# Patient Record
Sex: FEMALE | Race: WHITE | NOT HISPANIC OR LATINO | Employment: FULL TIME | URBAN - NONMETROPOLITAN AREA
[De-identification: names, ages, dates, MRNs, and addresses within clinical notes are randomized per-mention and may not be internally consistent; named-entity substitution may affect disease eponyms.]

---

## 2017-08-11 ENCOUNTER — LAB (OUTPATIENT)
Dept: LAB | Facility: HOSPITAL | Age: 42
End: 2017-08-11

## 2017-08-11 ENCOUNTER — TRANSCRIBE ORDERS (OUTPATIENT)
Dept: GENERAL RADIOLOGY | Facility: HOSPITAL | Age: 42
End: 2017-08-11

## 2017-08-11 ENCOUNTER — HOSPITAL ENCOUNTER (OUTPATIENT)
Dept: GENERAL RADIOLOGY | Facility: HOSPITAL | Age: 42
Discharge: HOME | End: 2017-08-11
Admitting: NURSE PRACTITIONER

## 2017-08-11 DIAGNOSIS — R10.84 GENERALIZED ABDOMINAL PAIN: ICD-10-CM

## 2017-08-11 DIAGNOSIS — R19.7 DIARRHEA, UNSPECIFIED TYPE: ICD-10-CM

## 2017-08-11 DIAGNOSIS — R19.7 DIARRHEA, UNSPECIFIED TYPE: Primary | ICD-10-CM

## 2017-08-11 LAB
ALBUMIN SERPL-MCNC: 4 G/DL (ref 3.5–5)
ALBUMIN/GLOB SERPL: 1 G/DL (ref 1.1–2.5)
ALP SERPL-CCNC: 90 U/L (ref 24–120)
ALT SERPL W P-5'-P-CCNC: 39 U/L (ref 0–54)
ANION GAP SERPL CALCULATED.3IONS-SCNC: 12 MMOL/L (ref 4–13)
AST SERPL-CCNC: 27 U/L (ref 7–45)
AUTO MIXED CELLS #: 0.7 10*3/MM3 (ref 0.1–2.6)
AUTO MIXED CELLS %: 9.3 % (ref 0.1–24)
BILIRUB SERPL-MCNC: 0.5 MG/DL (ref 0.1–1)
BILIRUB UR QL STRIP: NEGATIVE
BUN BLD-MCNC: 10 MG/DL (ref 5–21)
BUN/CREAT SERPL: 14.7
CALCIUM SPEC-SCNC: 9.6 MG/DL (ref 8.4–10.4)
CHLORIDE SERPL-SCNC: 103 MMOL/L (ref 98–110)
CLARITY UR: CLEAR
CO2 SERPL-SCNC: 25 MMOL/L (ref 24–31)
COLOR UR: YELLOW
CREAT BLD-MCNC: 0.68 MG/DL (ref 0.5–1.4)
ERYTHROCYTE [DISTWIDTH] IN BLOOD BY AUTOMATED COUNT: 12.8 % (ref 12–15)
ERYTHROCYTE [SEDIMENTATION RATE] IN BLOOD: 9 MM/HR (ref 0–20)
GFR SERPL CREATININE-BSD FRML MDRD: 94 ML/MIN/1.73
GLOBULIN UR ELPH-MCNC: 4 GM/DL
GLUCOSE BLD-MCNC: 89 MG/DL (ref 70–100)
GLUCOSE UR STRIP-MCNC: NEGATIVE MG/DL
HCT VFR BLD AUTO: 39.5 % (ref 37–47)
HGB BLD-MCNC: 13.5 G/DL (ref 12–16)
HGB UR QL STRIP.AUTO: NEGATIVE
KETONES UR QL STRIP: NEGATIVE
LEUKOCYTE ESTERASE UR QL STRIP.AUTO: NEGATIVE
LYMPHOCYTES # BLD AUTO: 2.6 10*3/MM3 (ref 0.8–7)
LYMPHOCYTES NFR BLD AUTO: 32.9 % (ref 15–45)
MCH RBC QN AUTO: 30.1 PG (ref 28–32)
MCHC RBC AUTO-ENTMCNC: 34.2 G/DL (ref 33–36)
MCV RBC AUTO: 88.2 FL (ref 82–98)
NEUTROPHILS # BLD AUTO: 4.5 10*3/MM3 (ref 1.5–8.3)
NEUTROPHILS NFR BLD AUTO: 57.8 % (ref 39–78)
NITRITE UR QL STRIP: NEGATIVE
PH UR STRIP.AUTO: 5.5 [PH] (ref 5–8)
PLATELET # BLD AUTO: 290 10*3/MM3 (ref 130–400)
PMV BLD AUTO: 8.9 FL (ref 6–12)
POTASSIUM BLD-SCNC: 4.6 MMOL/L (ref 3.5–5.3)
PROT SERPL-MCNC: 8 G/DL (ref 6.3–8.7)
PROT UR QL STRIP: NEGATIVE
RBC # BLD AUTO: 4.48 10*6/MM3 (ref 4.2–5.4)
SODIUM BLD-SCNC: 140 MMOL/L (ref 135–145)
SP GR UR STRIP: 1.02 (ref 1–1.03)
UROBILINOGEN UR QL STRIP: NORMAL
WBC NRBC COR # BLD: 7.8 10*3/MM3 (ref 4.8–10.8)

## 2017-08-11 PROCEDURE — 36415 COLL VENOUS BLD VENIPUNCTURE: CPT

## 2017-08-11 PROCEDURE — 85025 COMPLETE CBC W/AUTO DIFF WBC: CPT

## 2017-08-11 PROCEDURE — 85652 RBC SED RATE AUTOMATED: CPT

## 2017-08-11 PROCEDURE — 80053 COMPREHEN METABOLIC PANEL: CPT

## 2017-08-11 PROCEDURE — 74000 HC ABDOMEN KUB: CPT

## 2017-08-11 PROCEDURE — 81003 URINALYSIS AUTO W/O SCOPE: CPT

## 2017-08-21 ENCOUNTER — TRANSCRIBE ORDERS (OUTPATIENT)
Dept: ADMINISTRATIVE | Facility: HOSPITAL | Age: 42
End: 2017-08-21

## 2017-08-21 DIAGNOSIS — Z13.9 SCREENING: Primary | ICD-10-CM

## 2017-08-25 ENCOUNTER — HOSPITAL ENCOUNTER (OUTPATIENT)
Dept: MAMMOGRAPHY | Facility: HOSPITAL | Age: 42
Discharge: HOME | End: 2017-08-25
Admitting: OBSTETRICS & GYNECOLOGY

## 2017-08-25 DIAGNOSIS — Z13.9 SCREENING: ICD-10-CM

## 2017-08-25 PROCEDURE — G0202 SCR MAMMO BI INCL CAD: HCPCS

## 2017-08-25 PROCEDURE — 77063 BREAST TOMOSYNTHESIS BI: CPT

## 2019-09-26 ENCOUNTER — OFFICE VISIT (OUTPATIENT)
Dept: GASTROENTEROLOGY | Facility: CLINIC | Age: 44
End: 2019-09-26

## 2019-09-26 VITALS
TEMPERATURE: 97.8 F | HEART RATE: 86 BPM | SYSTOLIC BLOOD PRESSURE: 126 MMHG | OXYGEN SATURATION: 98 % | DIASTOLIC BLOOD PRESSURE: 88 MMHG | BODY MASS INDEX: 38.49 KG/M2 | HEIGHT: 68 IN | WEIGHT: 254 LBS

## 2019-09-26 DIAGNOSIS — R13.19 ESOPHAGEAL DYSPHAGIA: ICD-10-CM

## 2019-09-26 DIAGNOSIS — K21.9 GASTROESOPHAGEAL REFLUX DISEASE, ESOPHAGITIS PRESENCE NOT SPECIFIED: Primary | ICD-10-CM

## 2019-09-26 DIAGNOSIS — E66.9 OBESITY, UNSPECIFIED OBESITY SEVERITY, UNSPECIFIED OBESITY TYPE: ICD-10-CM

## 2019-09-26 DIAGNOSIS — Z78.9 NONSMOKER: ICD-10-CM

## 2019-09-26 PROCEDURE — 99214 OFFICE O/P EST MOD 30 MIN: CPT | Performed by: CLINICAL NURSE SPECIALIST

## 2019-09-26 NOTE — PROGRESS NOTES
Yola Mitchell  1975 9/26/2019  Chief Complaint   Patient presents with   • Heartburn   • Difficulty Swallowing     Subjective   HPI  Yola Mitchell is a 43 y.o. female who presents with a complaint of reflux and difficulty swallowing. She says she was provided PPI and this is better with the medication. She is having occasional breakthrough at least once per week. It is mild to moderate. Her dysphagia is with meat or breads dry foods it is intermittent and better with PPI therapy. Omeprazole 40 mg daily. Her reflux is described as a burning up to her throat. She has had this for many years since the 90s. She has taken Protonix for this in the past. She is on NSAIDS. No nausea or vomiting currently. NO wt loss. No fever chills or sweats.   Past Medical History:   Diagnosis Date   • Back injury    • Depression    • GERD (gastroesophageal reflux disease)    • Hyperlipidemia    • Hypertension    • IBS (irritable bowel syndrome)      Past Surgical History:   Procedure Laterality Date   • COLONOSCOPY     • CYST REMOVAL     • LEEP     • WISDOM TOOTH EXTRACTION         Outpatient Medications Marked as Taking for the 9/26/19 encounter (Office Visit) with Cici Wall APRN   Medication Sig Dispense Refill   • baclofen (LIORESAL) 10 MG tablet Take 10 mg by mouth 3 (Three) Times a Day.     • budesonide (RINOCORT AQUA) 32 MCG/ACT nasal spray 1 spray into the nostril(s) as directed by provider Daily.     • FLUoxetine (PROzac) 60 MG tablet Take 10 mg by mouth Daily.     • gabapentin (NEURONTIN) 300 MG capsule Take 300 mg by mouth 2 (Two) Times a Day.     • loratadine-pseudoephedrine (CLARITIN-D 24 HOUR)  MG per 24 hr tablet Take 1 tablet by mouth Daily. 7 tablet 0   • meloxicam (MOBIC) 15 MG tablet Take 15 mg by mouth Daily.     • omeprazole (priLOSEC) 40 MG capsule Take 40 mg by mouth Daily.     • simvastatin (ZOCOR) 40 MG tablet Take 40 mg by mouth Every Night.       No Known Allergies  Social History      Socioeconomic History   • Marital status:      Spouse name: Not on file   • Number of children: Not on file   • Years of education: Not on file   • Highest education level: Not on file   Tobacco Use   • Smoking status: Never Smoker   • Smokeless tobacco: Never Used   Substance and Sexual Activity   • Alcohol use: Yes     Comment: occ.    • Drug use: No   • Sexual activity: Defer     Family History   Problem Relation Age of Onset   • Breast cancer Neg Hx    • Colon cancer Neg Hx    • Colon polyps Neg Hx      Health Maintenance   Topic Date Due   • ANNUAL PHYSICAL  11/30/1976   • TDAP/TD VACCINES (1 - Tdap) 11/30/1992   • LIPID PANEL  01/22/2018   • PAP SMEAR  09/20/2019   • INFLUENZA VACCINE  Completed     Review of Systems   Constitutional: Negative for activity change, appetite change, chills, diaphoresis, fatigue, fever and unexpected weight change.   HENT: Positive for trouble swallowing. Negative for ear pain, hearing loss, mouth sores, sore throat and voice change.    Eyes: Negative.    Respiratory: Negative for cough, choking, shortness of breath and wheezing.    Cardiovascular: Negative for chest pain and palpitations.   Gastrointestinal: Negative for abdominal pain, blood in stool, constipation, diarrhea, nausea and vomiting.        GERD   Endocrine: Negative for cold intolerance and heat intolerance.   Genitourinary: Negative for decreased urine volume, dysuria, frequency, hematuria and urgency.   Musculoskeletal: Negative for back pain, gait problem and myalgias.   Skin: Negative for color change, pallor and rash.   Allergic/Immunologic: Negative for food allergies and immunocompromised state.   Neurological: Negative for dizziness, tremors, seizures, syncope, weakness, light-headedness, numbness and headaches.   Hematological: Negative for adenopathy. Does not bruise/bleed easily.   Psychiatric/Behavioral: Negative for agitation and confusion. The patient is not nervous/anxious.    All other  "systems reviewed and are negative.    Objective   Vitals:    09/26/19 0940   BP: 126/88   Pulse: 86   Temp: 97.8 °F (36.6 °C)   SpO2: 98%   Weight: 115 kg (254 lb)   Height: 172.7 cm (68\")     Body mass index is 38.62 kg/m².  Physical Exam   Constitutional: She is oriented to person, place, and time. She appears well-developed and well-nourished.   HENT:   Head: Normocephalic and atraumatic.   Eyes: Pupils are equal, round, and reactive to light.   Neck: Normal range of motion. Neck supple. No tracheal deviation present.   Cardiovascular: Normal rate, regular rhythm and normal heart sounds. Exam reveals no gallop and no friction rub.   No murmur heard.  Pulmonary/Chest: Effort normal and breath sounds normal. No respiratory distress. She has no wheezes. She has no rales. She exhibits no tenderness.   Abdominal: Soft. Bowel sounds are normal. She exhibits no distension. There is no hepatosplenomegaly. There is no tenderness. There is no rigidity, no rebound and no guarding.   Musculoskeletal: Normal range of motion. She exhibits no edema, tenderness or deformity.   Neurological: She is alert and oriented to person, place, and time. She has normal reflexes.   Skin: Skin is warm and dry. No rash noted. No pallor.   Psychiatric: She has a normal mood and affect. Her behavior is normal. Judgment and thought content normal.     Assessment/Plan   Yola was seen today for heartburn and difficulty swallowing.    Diagnoses and all orders for this visit:    Gastroesophageal reflux disease, esophagitis presence not specified  -     Case Request; Standing  -     Follow Anesthesia Guidelines / Standing Orders; Future  -     Obtain Informed Consent; Future  -     Implement Anesthesia Orders Day of Procedure; Standing  -     Obtain Informed Consent; Standing  -     Case Request    Esophageal dysphagia  Comments:  better with medication    Nonsmoker    Obesity, unspecified obesity severity, unspecified obesity type    I discussed " non pharmaceutical treatment of gerd.  This includes gradually losing weight to achieve ideal body wt., elevation of the head of bed by 4-6 inches, nothing to eat or drink 3 hours prior to lying down, avoiding tight clothing, stress reduction, tobacco cessation, reduction of alcohol intake, and dietary restrictions (avoiding caffeine, coffee, fatty foods, mints, chocolate, spicy foods and tomato based sauces as much as possible).    Add Gaviscon as needed for breakthrough    ESOPHAGOGASTRODUODENOSCOPY WITH ANESTHESIA (N/A)  EMR Dragon/transcription disclaimer: Much of this encounter note is electronic transcription/translation of spoken language to printed text. The electronic translation of spoken language may be erroneous, or at times, nonsensical words or phrases may be inadvertently transcribed. Although I have reviewed the note for such errors, some may still exist.  Body mass index is 38.62 kg/m².  Return if symptoms worsen or fail to improve.    Patient's Body mass index is 38.62 kg/m². BMI is above normal parameters. Recommendations include: nutrition counseling.      All risks, benefits, alternatives, and indications of colonoscopy and/or Endoscopy procedure have been discussed with the patient. Risks to include perforation of the colon requiring possible surgery or colostomy, risk of bleeding from biopsies or removal of colon tissue, possibility of missing a colon polyp or cancer, or adverse drug reaction.  Benefits to include the diagnosis and management of disease of the colon and rectum. Alternatives to include barium enema, radiographic evaluation, lab testing or no intervention. Pt verbalizes understanding and agrees.     Cici Wall, APRN  9/26/2019  10:00 AM      Obesity, Adult  Obesity is the condition of having too much total body fat. Being overweight or obese means that your weight is greater than what is considered healthy for your body size. Obesity is determined by a measurement  called BMI. BMI is an estimate of body fat and is calculated from height and weight. For adults, a BMI of 30 or higher is considered obese.  Obesity can eventually lead to other health concerns and major illnesses, including:  · Stroke.  · Coronary artery disease (CAD).  · Type 2 diabetes.  · Some types of cancer, including cancers of the colon, breast, uterus, and gallbladder.  · Osteoarthritis.  · High blood pressure (hypertension).  · High cholesterol.  · Sleep apnea.  · Gallbladder stones.  · Infertility problems.  What are the causes?  The main cause of obesity is taking in (consuming) more calories than your body uses for energy. Other factors that contribute to this condition may include:  · Being born with genes that make you more likely to become obese.  · Having a medical condition that causes obesity. These conditions include:  ¨ Hypothyroidism.  ¨ Polycystic ovarian syndrome (PCOS).  ¨ Binge-eating disorder.  ¨ Cushing syndrome.  · Taking certain medicines, such as steroids, antidepressants, and seizure medicines.  · Not being physically active (sedentary lifestyle).  · Living where there are limited places to exercise safely or buy healthy foods.  · Not getting enough sleep.  What increases the risk?  The following factors may increase your risk of this condition:  · Having a family history of obesity.  · Being a woman of -American descent.  · Being a man of  descent.  What are the signs or symptoms?  Having excessive body fat is the main symptom of this condition.  How is this diagnosed?  This condition may be diagnosed based on:  · Your symptoms.  · Your medical history.  · A physical exam. Your health care provider may measure:  ¨ Your BMI. If you are an adult with a BMI between 25 and less than 30, you are considered overweight. If you are an adult with a BMI of 30 or higher, you are considered obese.  ¨ The distances around your hips and your waist (circumferences). These may be  compared to each other to help diagnose your condition.  ¨ Your skinfold thickness. Your health care provider may gently pinch a fold of your skin and measure it.  How is this treated?  Treatment for this condition often includes changing your lifestyle. Treatment may include some or all of the following:  · Dietary changes. Work with your health care provider and a dietitian to set a weight-loss goal that is healthy and reasonable for you. Dietary changes may include eating:  ¨ Smaller portions. A portion size is the amount of a particular food that is healthy for you to eat at one time. This varies from person to person.  ¨ Low-calorie or low-fat options.  ¨ More whole grains, fruits, and vegetables.  · Regular physical activity. This may include aerobic activity (cardio) and strength training.  · Medicine to help you lose weight. Your health care provider may prescribe medicine if you are unable to lose 1 pound a week after 6 weeks of eating more healthily and doing more physical activity.  · Surgery. Surgical options may include gastric banding and gastric bypass. Surgery may be done if:  ¨ Other treatments have not helped to improve your condition.  ¨ You have a BMI of 40 or higher.  ¨ You have life-threatening health problems related to obesity.  Follow these instructions at home:     Eating and drinking     · Follow recommendations from your health care provider about what you eat and drink. Your health care provider may advise you to:  ¨ Limit fast foods, sweets, and processed snack foods.  ¨ Choose low-fat options, such as low-fat milk instead of whole milk.  ¨ Eat 5 or more servings of fruits or vegetables every day.  ¨ Eat at home more often. This gives you more control over what you eat.  ¨ Choose healthy foods when you eat out.  ¨ Learn what a healthy portion size is.  ¨ Keep low-fat snacks on hand.  ¨ Avoid sugary drinks, such as soda, fruit juice, iced tea sweetened with sugar, and flavored  milk.  ¨ Eat a healthy breakfast.  · Drink enough water to keep your urine clear or pale yellow.  · Do not go without eating for long periods of time (do not fast) or follow a fad diet. Fasting and fad diets can be unhealthy and even dangerous.  Physical Activity   · Exercise regularly, as told by your health care provider. Ask your health care provider what types of exercise are safe for you and how often you should exercise.  · Warm up and stretch before being active.  · Cool down and stretch after being active.  · Rest between periods of activity.  Lifestyle   · Limit the time that you spend in front of your TV, computer, or video game system.  · Find ways to reward yourself that do not involve food.  · Limit alcohol intake to no more than 1 drink a day for nonpregnant women and 2 drinks a day for men. One drink equals 12 oz of beer, 5 oz of wine, or 1½ oz of hard liquor.  General instructions   · Keep a weight loss journal to keep track of the food you eat and how much you exercise you get.  · Take over-the-counter and prescription medicines only as told by your health care provider.  · Take vitamins and supplements only as told by your health care provider.  · Consider joining a support group. Your health care provider may be able to recommend a support group.  · Keep all follow-up visits as told by your health care provider. This is important.  Contact a health care provider if:  · You are unable to meet your weight loss goal after 6 weeks of dietary and lifestyle changes.  This information is not intended to replace advice given to you by your health care provider. Make sure you discuss any questions you have with your health care provider.  Document Released: 01/25/2006 Document Revised: 05/22/2017 Document Reviewed: 10/05/2016  [employer] Interactive Patient Education © 2017 [employer]      If you smoke or use tobacco, 4 minutes reading provided  Steps to Quit Smoking  Smoking tobacco can be harmful to  your health and can affect almost every organ in your body. Smoking puts you, and those around you, at risk for developing many serious chronic diseases. Quitting smoking is difficult, but it is one of the best things that you can do for your health. It is never too late to quit.  What are the benefits of quitting smoking?  When you quit smoking, you lower your risk of developing serious diseases and conditions, such as:  · Lung cancer or lung disease, such as COPD.  · Heart disease.  · Stroke.  · Heart attack.  · Infertility.  · Osteoporosis and bone fractures.  Additionally, symptoms such as coughing, wheezing, and shortness of breath may get better when you quit. You may also find that you get sick less often because your body is stronger at fighting off colds and infections. If you are pregnant, quitting smoking can help to reduce your chances of having a baby of low birth weight.  How do I get ready to quit?  When you decide to quit smoking, create a plan to make sure that you are successful. Before you quit:  · Pick a date to quit. Set a date within the next two weeks to give you time to prepare.  · Write down the reasons why you are quitting. Keep this list in places where you will see it often, such as on your bathroom mirror or in your car or wallet.  · Identify the people, places, things, and activities that make you want to smoke (triggers) and avoid them. Make sure to take these actions:  ¨ Throw away all cigarettes at home, at work, and in your car.  ¨ Throw away smoking accessories, such as ashtrays and lighters.  ¨ Clean your car and make sure to empty the ashtray.  ¨ Clean your home, including curtains and carpets.  · Tell your family, friends, and coworkers that you are quitting. Support from your loved ones can make quitting easier.  · Talk with your health care provider about your options for quitting smoking.  · Find out what treatment options are covered by your health insurance.  What  strategies can I use to quit smoking?  Talk with your healthcare provider about different strategies to quit smoking. Some strategies include:  · Quitting smoking altogether instead of gradually lessening how much you smoke over a period of time. Research shows that quitting “cold turkey” is more successful than gradually quitting.  · Attending in-person counseling to help you build problem-solving skills. You are more likely to have success in quitting if you attend several counseling sessions. Even short sessions of 10 minutes can be effective.  · Finding resources and support systems that can help you to quit smoking and remain smoke-free after you quit. These resources are most helpful when you use them often. They can include:  ¨ Online chats with a counselor.  ¨ Telephone quitlines.  ¨ Printed self-help materials.  ¨ Support groups or group counseling.  ¨ Text messaging programs.  ¨ Mobile phone applications.  · Taking medicines to help you quit smoking. (If you are pregnant or breastfeeding, talk with your health care provider first.) Some medicines contain nicotine and some do not. Both types of medicines help with cravings, but the medicines that include nicotine help to relieve withdrawal symptoms. Your health care provider may recommend:  ¨ Nicotine patches, gum, or lozenges.  ¨ Nicotine inhalers or sprays.  ¨ Non-nicotine medicine that is taken by mouth.  Talk with your health care provider about combining strategies, such as taking medicines while you are also receiving in-person counseling. Using these two strategies together makes you more likely to succeed in quitting than if you used either strategy on its own.  If you are pregnant or breastfeeding, talk with your health care provider about finding counseling or other support strategies to quit smoking. Do not take medicine to help you quit smoking unless told to do so by your health care provider.  What things can I do to make it easier to  quit?  Quitting smoking might feel overwhelming at first, but there is a lot that you can do to make it easier. Take these important actions:  · Reach out to your family and friends and ask that they support and encourage you during this time. Call telephone quitlines, reach out to support groups, or work with a counselor for support.  · Ask people who smoke to avoid smoking around you.  · Avoid places that trigger you to smoke, such as bars, parties, or smoke-break areas at work.  · Spend time around people who do not smoke.  · Lessen stress in your life, because stress can be a smoking trigger for some people. To lessen stress, try:  ¨ Exercising regularly.  ¨ Deep-breathing exercises.  ¨ Yoga.  ¨ Meditating.  ¨ Performing a body scan. This involves closing your eyes, scanning your body from head to toe, and noticing which parts of your body are particularly tense. Purposefully relax the muscles in those areas.  · Download or purchase mobile phone or tablet apps (applications) that can help you stick to your quit plan by providing reminders, tips, and encouragement. There are many free apps, such as QuitGuide from the CDC (Centers for Disease Control and Prevention). You can find other support for quitting smoking (smoking cessation) through smokefree.gov and other websites.  How will I feel when I quit smoking?  Within the first 24 hours of quitting smoking, you may start to feel some withdrawal symptoms. These symptoms are usually most noticeable 2-3 days after quitting, but they usually do not last beyond 2-3 weeks. Changes or symptoms that you might experience include:  · Mood swings.  · Restlessness, anxiety, or irritation.  · Difficulty concentrating.  · Dizziness.  · Strong cravings for sugary foods in addition to nicotine.  · Mild weight gain.  · Constipation.  · Nausea.  · Coughing or a sore throat.  · Changes in how your medicines work in your body.  · A depressed mood.  · Difficulty sleeping  (insomnia).  After the first 2-3 weeks of quitting, you may start to notice more positive results, such as:  · Improved sense of smell and taste.  · Decreased coughing and sore throat.  · Slower heart rate.  · Lower blood pressure.  · Clearer skin.  · The ability to breathe more easily.  · Fewer sick days.  Quitting smoking is very challenging for most people. Do not get discouraged if you are not successful the first time. Some people need to make many attempts to quit before they achieve long-term success. Do your best to stick to your quit plan, and talk with your health care provider if you have any questions or concerns.  This information is not intended to replace advice given to you by your health care provider. Make sure you discuss any questions you have with your health care provider.  Document Released: 12/12/2002 Document Revised: 08/15/2017 Document Reviewed: 05/03/2016  [employer] Interactive Patient Education © 2017 [employer]

## 2019-10-29 ENCOUNTER — HOSPITAL ENCOUNTER (OUTPATIENT)
Facility: HOSPITAL | Age: 44
Setting detail: HOSPITAL OUTPATIENT SURGERY
Discharge: HOME | End: 2019-10-29
Attending: INTERNAL MEDICINE | Admitting: INTERNAL MEDICINE

## 2019-10-29 ENCOUNTER — ANESTHESIA EVENT (OUTPATIENT)
Dept: GASTROENTEROLOGY | Facility: HOSPITAL | Age: 44
End: 2019-10-29

## 2019-10-29 ENCOUNTER — ANESTHESIA (OUTPATIENT)
Dept: GASTROENTEROLOGY | Facility: HOSPITAL | Age: 44
End: 2019-10-29

## 2019-10-29 VITALS
WEIGHT: 246 LBS | SYSTOLIC BLOOD PRESSURE: 111 MMHG | BODY MASS INDEX: 37.28 KG/M2 | DIASTOLIC BLOOD PRESSURE: 62 MMHG | HEIGHT: 68 IN | TEMPERATURE: 97.2 F | RESPIRATION RATE: 18 BRPM | OXYGEN SATURATION: 100 % | HEART RATE: 69 BPM

## 2019-10-29 DIAGNOSIS — K21.9 GASTROESOPHAGEAL REFLUX DISEASE, ESOPHAGITIS PRESENCE NOT SPECIFIED: ICD-10-CM

## 2019-10-29 LAB — B-HCG UR QL: NEGATIVE

## 2019-10-29 PROCEDURE — 81025 URINE PREGNANCY TEST: CPT | Performed by: NURSE ANESTHETIST, CERTIFIED REGISTERED

## 2019-10-29 PROCEDURE — 43239 EGD BIOPSY SINGLE/MULTIPLE: CPT | Performed by: INTERNAL MEDICINE

## 2019-10-29 PROCEDURE — 87081 CULTURE SCREEN ONLY: CPT | Performed by: INTERNAL MEDICINE

## 2019-10-29 PROCEDURE — 25010000002 PROPOFOL 10 MG/ML EMULSION: Performed by: NURSE ANESTHETIST, CERTIFIED REGISTERED

## 2019-10-29 RX ORDER — SODIUM CHLORIDE 9 MG/ML
500 INJECTION, SOLUTION INTRAVENOUS CONTINUOUS PRN
Status: DISCONTINUED | OUTPATIENT
Start: 2019-10-29 | End: 2019-10-29 | Stop reason: HOSPADM

## 2019-10-29 RX ORDER — PROPOFOL 10 MG/ML
VIAL (ML) INTRAVENOUS AS NEEDED
Status: DISCONTINUED | OUTPATIENT
Start: 2019-10-29 | End: 2019-10-29 | Stop reason: SURG

## 2019-10-29 RX ORDER — SODIUM CHLORIDE 0.9 % (FLUSH) 0.9 %
10 SYRINGE (ML) INJECTION AS NEEDED
Status: DISCONTINUED | OUTPATIENT
Start: 2019-10-29 | End: 2019-10-29 | Stop reason: HOSPADM

## 2019-10-29 RX ADMIN — PROPOFOL 100 MG: 10 INJECTION, EMULSION INTRAVENOUS at 07:56

## 2019-10-29 RX ADMIN — LIDOCAINE HYDROCHLORIDE 40 MG: 20 INJECTION, SOLUTION INTRAVENOUS at 07:56

## 2019-10-29 RX ADMIN — PROPOFOL 25 MG: 10 INJECTION, EMULSION INTRAVENOUS at 07:58

## 2019-10-29 RX ADMIN — SODIUM CHLORIDE 500 ML: 9 INJECTION, SOLUTION INTRAVENOUS at 07:24

## 2019-10-29 RX ADMIN — PROPOFOL 25 MG: 10 INJECTION, EMULSION INTRAVENOUS at 07:57

## 2019-10-29 NOTE — ANESTHESIA PREPROCEDURE EVALUATION
Anesthesia Evaluation     NPO Solid Status: > 8 hours  NPO Liquid Status: > 8 hours           Airway   Mallampati: II  TM distance: >3 FB  No difficulty expected  Dental - normal exam     Pulmonary - negative pulmonary ROS and normal exam   Cardiovascular - normal exam    (+) hypertension well controlled, hyperlipidemia,       Neuro/Psych- negative ROS  GI/Hepatic/Renal/Endo    (+) obesity, morbid obesity, GERD poorly controlled,      Musculoskeletal (-) negative ROS    Abdominal  - normal exam   Substance History - negative use     OB/GYN negative ob/gyn ROS         Other - negative ROS                       Anesthesia Plan    ASA 2     MAC     intravenous induction   Anesthetic plan, all risks, benefits, and alternatives have been provided, discussed and informed consent has been obtained with: patient.

## 2019-10-29 NOTE — ANESTHESIA POSTPROCEDURE EVALUATION
Patient: Yola Mitchell    Procedure Summary     Date:  10/29/19 Room / Location:  [facility] ENDOSCOPY 6 / [facility]    Anesthesia Start:  0755 Anesthesia Stop:  0802    Procedure:  ESOPHAGOGASTRODUODENOSCOPY WITH ANESTHESIA (N/A ) Diagnosis:       Gastroesophageal reflux disease, esophagitis presence not specified      (Gastroesophageal reflux disease, esophagitis presence not specified [K21.9])    Surgeon:  Berry Turner MD Provider:  Grant Helms CRNA    Anesthesia Type:  MAC ASA Status:  2          Anesthesia Type: MAC  Last vitals  BP   111/62 (10/29/19 0820)   Temp   97.2 °F (36.2 °C) (10/29/19 0704)   Pulse   69 (10/29/19 0820)   Resp   18 (10/29/19 0820)     SpO2   100 % (10/29/19 0820)     Post Anesthesia Care and Evaluation    Patient location during evaluation: PHASE II  Patient participation: complete - patient participated  Level of consciousness: awake  Pain score: 0  Pain management: adequate  Airway patency: patent  Anesthetic complications: No anesthetic complications  PONV Status: none  Cardiovascular status: acceptable  Respiratory status: acceptable  Hydration status: acceptable  No anesthesia care post op

## 2019-10-30 LAB — UREASE TISS QL: NEGATIVE

## 2020-03-25 ENCOUNTER — TRANSCRIBE ORDERS (OUTPATIENT)
Dept: ADMINISTRATIVE | Facility: HOSPITAL | Age: 45
End: 2020-03-25

## 2020-03-25 DIAGNOSIS — Z12.31 ENCOUNTER FOR SCREENING MAMMOGRAM FOR MALIGNANT NEOPLASM OF BREAST: Primary | ICD-10-CM

## 2020-12-08 ENCOUNTER — TRANSCRIBE ORDERS (OUTPATIENT)
Dept: LAB | Facility: HOSPITAL | Age: 45
End: 2020-12-08

## 2020-12-08 ENCOUNTER — LAB (OUTPATIENT)
Dept: LAB | Facility: HOSPITAL | Age: 45
End: 2020-12-08

## 2020-12-08 DIAGNOSIS — Z20.828 VIRAL DISEASE EXPOSURE: ICD-10-CM

## 2020-12-08 DIAGNOSIS — R50.9 FEVER, UNSPECIFIED: ICD-10-CM

## 2020-12-08 DIAGNOSIS — R06.02 SHORTNESS OF BREATH: ICD-10-CM

## 2020-12-08 DIAGNOSIS — R05.9 COUGH: ICD-10-CM

## 2020-12-08 DIAGNOSIS — R05.9 COUGH: Primary | ICD-10-CM

## 2020-12-08 LAB — SARS-COV-2 RNA RESP QL NAA+PROBE: NOT DETECTED

## 2020-12-08 PROCEDURE — 87635 SARS-COV-2 COVID-19 AMP PRB: CPT

## 2020-12-08 PROCEDURE — C9803 HOPD COVID-19 SPEC COLLECT: HCPCS

## 2021-01-07 ENCOUNTER — TRANSCRIBE ORDERS (OUTPATIENT)
Dept: LAB | Facility: HOSPITAL | Age: 46
End: 2021-01-07

## 2021-01-07 ENCOUNTER — LAB (OUTPATIENT)
Dept: LAB | Facility: HOSPITAL | Age: 46
End: 2021-01-07

## 2021-01-07 DIAGNOSIS — R11.0 NAUSEA: ICD-10-CM

## 2021-01-07 DIAGNOSIS — J06.9 ACUTE UPPER RESPIRATORY INFECTION: Primary | ICD-10-CM

## 2021-01-07 DIAGNOSIS — R51.9 INTRACTABLE HEADACHE, UNSPECIFIED CHRONICITY PATTERN, UNSPECIFIED HEADACHE TYPE: ICD-10-CM

## 2021-01-07 DIAGNOSIS — J06.9 ACUTE UPPER RESPIRATORY INFECTION: ICD-10-CM

## 2021-01-07 PROCEDURE — C9803 HOPD COVID-19 SPEC COLLECT: HCPCS

## 2021-01-07 PROCEDURE — U0004 COV-19 TEST NON-CDC HGH THRU: HCPCS

## 2021-01-08 LAB — SARS-COV-2 ORF1AB RESP QL NAA+PROBE: NOT DETECTED

## 2021-01-22 ENCOUNTER — HOSPITAL ENCOUNTER (OUTPATIENT)
Dept: MAMMOGRAPHY | Facility: HOSPITAL | Age: 46
Discharge: HOME | End: 2021-01-22
Admitting: FAMILY MEDICINE

## 2021-01-22 DIAGNOSIS — Z12.31 ENCOUNTER FOR SCREENING MAMMOGRAM FOR MALIGNANT NEOPLASM OF BREAST: ICD-10-CM

## 2021-01-22 PROCEDURE — 77067 SCR MAMMO BI INCL CAD: CPT

## 2021-01-22 PROCEDURE — 77063 BREAST TOMOSYNTHESIS BI: CPT

## 2021-04-15 ENCOUNTER — TRANSCRIBE ORDERS (OUTPATIENT)
Dept: LAB | Facility: HOSPITAL | Age: 46
End: 2021-04-15

## 2021-04-15 DIAGNOSIS — Z01.818 PREOPERATIVE TESTING: Primary | ICD-10-CM

## 2021-04-19 ENCOUNTER — LAB (OUTPATIENT)
Dept: LAB | Facility: HOSPITAL | Age: 46
End: 2021-04-19

## 2021-04-19 DIAGNOSIS — Z01.818 PREOPERATIVE TESTING: ICD-10-CM

## 2021-04-19 LAB — SARS-COV-2 ORF1AB RESP QL NAA+PROBE: NOT DETECTED

## 2021-04-19 PROCEDURE — U0004 COV-19 TEST NON-CDC HGH THRU: HCPCS

## 2021-04-19 PROCEDURE — C9803 HOPD COVID-19 SPEC COLLECT: HCPCS

## 2021-04-21 NOTE — PROGRESS NOTES
YOB: 1975  Location: [facility]  Location Address: [address] [place] [address], [facility], [address] [place] [address]  Location Phone: [phone]    Chief Complaint   Patient presents with   • Sinus Problem     sinus headaches, deviated septum, sinus congestion, drainage, some coughing been going on for years, pt feels like it is getting worse and lasting longer each time       History of Present Illness  Yola Mitchell is a 45 y.o. female.  Yola Mitchell is here for evaluation of ENT complaints. The patient has had problems with ear fullness and ear pressure, nasal drainage, nasal congestion, sinusitis, sinus pressure, postnasal drip, facial pressure and headaches  The symptoms are localized to the ears, nose and right>left orbital, maxillary and frontal sinus. . The patient has had moderate symptoms. The symptoms have been present for the last several years There have been no identified factors that aggravate the symptoms. There have been no factors that have improved the symptoms. Patient also has sore throat, dental pain and reflux, which is medicated. Patient has not had testing. She has been on nasal sprays and antibiotics with minimal relief.     She is using nasal sprays in the past with no relief as well as antibiotics with minimal relief for several days when she completed her course.  She has had immunotherapy for years and is still on immunotherapy per Dr. Deal.       Past Medical History:   Diagnosis Date   • Back injury    • Depression    • GERD (gastroesophageal reflux disease)    • Hyperlipidemia    • Hypertension    • IBS (irritable bowel syndrome)    • Sinusitis        Past Surgical History:   Procedure Laterality Date   • COLONOSCOPY     • CYST REMOVAL     • ENDOSCOPY N/A 10/29/2019    Procedure: ESOPHAGOGASTRODUODENOSCOPY WITH ANESTHESIA;  Surgeon: Berry Turner MD;  Location: [facility] ENDOSCOPY;  Service: Gastroenterology   • LEEP     • WISDOM TOOTH EXTRACTION         Outpatient  Medications Marked as Taking for the 4/22/21 encounter (Office Visit) with Foster Watters MD   Medication Sig Dispense Refill   • ALPRAZolam (XANAX) 0.5 MG tablet Take 0.5 mg by mouth Daily As Needed.     • baclofen (LIORESAL) 10 MG tablet Take 10 mg by mouth 3 (Three) Times a Day.     • budesonide (RINOCORT AQUA) 32 MCG/ACT nasal spray 1 spray into the nostril(s) as directed by provider Daily.     • buPROPion XL (WELLBUTRIN XL) 150 MG 24 hr tablet      • EPINEPHrine (EPIPEN) 0.3 MG/0.3ML solution auto-injector injection epinephrine 0.3 mg/0.3 mL injection, auto-injector     • FLUoxetine (PROzac) 40 MG capsule Take 80 mg by mouth Daily.     • gabapentin (NEURONTIN) 300 MG capsule Take 300 mg by mouth 2 (Two) Times a Day.     • ipratropium (ATROVENT) 0.06 % nasal spray ipratropium bromide 42 mcg (0.06 %) nasal spray   U 2 SPRAYS IEN 2 TO 4 XD     • loratadine-pseudoephedrine (CLARITIN-D 24 HOUR)  MG per 24 hr tablet Take 1 tablet by mouth Daily. 7 tablet 0   • meloxicam (MOBIC) 15 MG tablet Take 15 mg by mouth Daily.     • omeprazole (priLOSEC) 40 MG capsule Take 40 mg by mouth Daily.     • rizatriptan (MAXALT) 10 MG tablet Take 10 mg by mouth Daily As Needed.     • simvastatin (ZOCOR) 40 MG tablet Take 40 mg by mouth Every Night.     • vitamin D (ERGOCALCIFEROL) 1.25 MG (01246 UT) capsule capsule TAKE 1 CAPSULE BY MOUTH 2 TIMES EVERY WEEK AS DIRECTED     • [DISCONTINUED] FLUoxetine (PROzac) 60 MG tablet Take 10 mg by mouth Daily.     • [DISCONTINUED] fluticasone (FLONASE) 50 MCG/ACT nasal spray 2 sprays into each nostril Daily. 1 bottle 0       Patient has no known allergies.    Family History   Problem Relation Age of Onset   • Cancer Mother    • Diabetes Father    • Breast cancer Neg Hx    • Colon cancer Neg Hx    • Colon polyps Neg Hx        Social History     Socioeconomic History   • Marital status:      Spouse name: Not on file   • Number of children: Not on file   • Years of education: Not  on file   • Highest education level: Not on file   Tobacco Use   • Smoking status: Never Smoker   • Smokeless tobacco: Never Used   Vaping Use   • Vaping Use: Never used   Substance and Sexual Activity   • Alcohol use: Yes     Comment: occ.    • Drug use: No   • Sexual activity: Defer       Review of Systems   Constitutional: Negative.    HENT: Positive for congestion, postnasal drip, rhinorrhea, sinus pressure and sore throat.         Ear pressure and fullness, dental pain   Eyes: Negative.    Respiratory: Negative.    Cardiovascular: Negative.    Gastrointestinal:        Reflux   Endocrine: Negative.    Genitourinary: Negative.    Musculoskeletal: Negative.    Skin: Negative.    Allergic/Immunologic: Negative.    Neurological: Positive for headaches.   Hematological: Negative.    Psychiatric/Behavioral: Negative.        Vitals:    04/22/21 1149   BP: 141/84   Pulse: 81   Temp: 97.1 °F (36.2 °C)       Body mass index is 38.77 kg/m².    Objective     Physical Exam  CONSTITUTIONAL: well nourished, well-developed, alert, oriented, in no acute distress     COMMUNICATION AND VOICE: able to communicate normally, normal voice quality    HEAD: normocephalic, no lesions, atraumatic, no tenderness, no masses     FACE: appearance normal, no lesions, no tenderness, no deformities, facial motion symmetric    EYES: ocular motility normal, eyelids normal, orbits normal, no proptosis, conjunctiva normal , pupils equal, round     EARS:  Hearing: hearing to conversational voice intact bilaterally   External Ears: normal bilaterally, no lesions  TMs  AS-clear and intact TM  AD-clear and intact TM    NOSE:  External Nose: external nasal structure normal, no tenderness on palpation, no nasal discharge, no lesions, no evidence of trauma, nostrils patent   Intranasal exam: See endoscopy    ORAL:  Lips: upper and lower lips without lesion   OC/OP-clear    NECK:  Inspection and Palpation: neck appearance normal, no masses or  tenderness    CHEST/RESPIRATORY: normal respiratory effort     CARDIOVASCULAR: no cyanosis or edema     NEUROLOGICAL/PSYCHIATRIC: oriented to time, place and person, mood normal, affect appropriate, CN II-XII intact grossly    Assessment/Plan  Diagnoses and all orders for this visit:    1. Chronic maxillary sinusitis (Primary)  -     CT Sinus Without Contrast    2. Nasal septal deviation  -     CT Sinus Without Contrast    3. Non-seasonal allergic rhinitis due to pollen  -     CT Sinus Without Contrast    Other orders  -     amoxicillin (AMOXIL) 500 MG capsule; Take 1 capsule by mouth 3 (Three) Times a Day for 14 days.  Dispense: 30 capsule; Refill: 0  -     fluticasone (Flonase) 50 MCG/ACT nasal spray; 2 sprays into the nostril(s) as directed by provider Daily for 30 days. Administer 2 sprays in each nostril for each dose.  Dispense: 18.2 mL; Refill: 6      * Surgery not found *  Orders Placed This Encounter   Procedures   • CT Sinus Without Contrast     Order Specific Question:   Patient Pregnant     Answer:   Unknown     Return in about 4 weeks (around 5/20/2021).       Patient Instructions   Continue immunotherapy  Flonase as tolerated  Amoxicillin  CT scan in 2 to 3 weeks  Follow-up in 3 to 4 weeks  Discontinue milk and dairy  Consider whole food plant-based diet

## 2021-04-22 ENCOUNTER — OFFICE VISIT (OUTPATIENT)
Dept: OTOLARYNGOLOGY | Facility: CLINIC | Age: 46
End: 2021-04-22

## 2021-04-22 VITALS
HEIGHT: 68 IN | SYSTOLIC BLOOD PRESSURE: 141 MMHG | BODY MASS INDEX: 38.65 KG/M2 | WEIGHT: 255 LBS | DIASTOLIC BLOOD PRESSURE: 84 MMHG | TEMPERATURE: 97.1 F | HEART RATE: 81 BPM

## 2021-04-22 DIAGNOSIS — J30.1 NON-SEASONAL ALLERGIC RHINITIS DUE TO POLLEN: ICD-10-CM

## 2021-04-22 DIAGNOSIS — J32.0 CHRONIC MAXILLARY SINUSITIS: Primary | ICD-10-CM

## 2021-04-22 DIAGNOSIS — J34.2 NASAL SEPTAL DEVIATION: ICD-10-CM

## 2021-04-22 PROCEDURE — 99204 OFFICE O/P NEW MOD 45 MIN: CPT | Performed by: OTOLARYNGOLOGY

## 2021-04-22 PROCEDURE — 31231 NASAL ENDOSCOPY DX: CPT | Performed by: OTOLARYNGOLOGY

## 2021-04-22 RX ORDER — IPRATROPIUM BROMIDE 42 UG/1
SPRAY, METERED NASAL
COMMUNITY
End: 2021-07-12

## 2021-04-22 RX ORDER — RIZATRIPTAN BENZOATE 10 MG/1
10 TABLET ORAL DAILY PRN
COMMUNITY
Start: 2021-03-27

## 2021-04-22 RX ORDER — FLUTICASONE PROPIONATE 50 MCG
2 SPRAY, SUSPENSION (ML) NASAL DAILY
Qty: 18.2 ML | Refills: 6 | Status: SHIPPED | OUTPATIENT
Start: 2021-04-22 | End: 2021-07-12

## 2021-04-22 RX ORDER — FLUOXETINE HYDROCHLORIDE 40 MG/1
80 CAPSULE ORAL DAILY
COMMUNITY
Start: 2021-01-25

## 2021-04-22 RX ORDER — EPINEPHRINE 0.3 MG/.3ML
INJECTION SUBCUTANEOUS
COMMUNITY

## 2021-04-22 RX ORDER — ALPRAZOLAM 0.5 MG/1
0.5 TABLET ORAL DAILY PRN
COMMUNITY
Start: 2021-03-25

## 2021-04-22 RX ORDER — ERGOCALCIFEROL 1.25 MG/1
CAPSULE ORAL
COMMUNITY
Start: 2021-04-02

## 2021-04-22 RX ORDER — AMOXICILLIN 500 MG/1
500 CAPSULE ORAL 3 TIMES DAILY
Qty: 30 CAPSULE | Refills: 0 | Status: SHIPPED | OUTPATIENT
Start: 2021-04-22 | End: 2021-05-06

## 2021-04-22 RX ORDER — BUPROPION HYDROCHLORIDE 150 MG/1
150 TABLET ORAL EVERY MORNING
COMMUNITY
Start: 2021-04-22

## 2021-04-22 NOTE — PROGRESS NOTES
PROCEDURE NOTE    Yola Mitchell    DATE OF PROCEDURE: 4/22/2021    PROCEDURE:   Bilateral Diagnostic Rigid Nasal Endoscopy    PREPROCEDURE DIAGNOSIS:   Chronic rhinosinusitis    POSTPROCEDURE DIAGNOSIS:  SAME    ANESTHESIA:   None    PROCEDURE DESCRIPTION:    With the patient in the chair bilateral diagnostic rigid nasal endoscopy was performed with the Stortz 0° endoscope without difficulty.     An endoscope was passed along the left nasal floor to the nasopharynx.  It was then passed into the region of the middle meatus, middle turbinate, and the sphenoethmoid region. An identical procedure was performed on the right side.  The following findings were noted as stated below:    Findings: Thick clear mucus bilaterally with inferior septal deviation on the left and mid posterior septal deviation on the right.  No evidence of nasal polyposis.  Mild lateralization of the middle turbinate right greater than left.  Bilateral inferior turbinate hypertrophy was appreciated    Condition:  Stable.  Patient tolerated procedure well.    Complications:  None  There was no significant bleeding.

## 2021-04-22 NOTE — PATIENT INSTRUCTIONS
Continue immunotherapy  Flonase as tolerated  Amoxicillin  CT scan in 2 to 3 weeks  Follow-up in 3 to 4 weeks  Discontinue milk and dairy  Consider whole food plant-based diet

## 2021-05-07 ENCOUNTER — APPOINTMENT (OUTPATIENT)
Dept: CT IMAGING | Facility: HOSPITAL | Age: 46
End: 2021-05-07

## 2021-05-19 ENCOUNTER — TRANSCRIBE ORDERS (OUTPATIENT)
Dept: LAB | Facility: HOSPITAL | Age: 46
End: 2021-05-19

## 2021-05-19 DIAGNOSIS — Z01.818 PREOP TESTING: Primary | ICD-10-CM

## 2021-05-21 ENCOUNTER — APPOINTMENT (OUTPATIENT)
Dept: CT IMAGING | Facility: HOSPITAL | Age: 46
End: 2021-05-21

## 2021-05-21 ENCOUNTER — LAB (OUTPATIENT)
Dept: LAB | Facility: HOSPITAL | Age: 46
End: 2021-05-21

## 2021-05-21 LAB — SARS-COV-2 ORF1AB RESP QL NAA+PROBE: NOT DETECTED

## 2021-05-21 PROCEDURE — C9803 HOPD COVID-19 SPEC COLLECT: HCPCS | Performed by: OTOLARYNGOLOGY

## 2021-05-21 PROCEDURE — U0004 COV-19 TEST NON-CDC HGH THRU: HCPCS | Performed by: OTOLARYNGOLOGY

## 2021-05-24 NOTE — PROGRESS NOTES
YOB: 1975  Location: [facility]  Location Address: [address] [place] [address], [facility] [address], [address] [place] [address]  Location Phone: [phone]    Chief Complaint   Patient presents with   • Follow-up     NC, sinus pain, pressure and headaches,  ear pain, dizzy, tender throat, throat clearing       History of Present Illness  Yola Mitchell is a 45 y.o. female.  Yola Mitchell is here for follow up of of ENT complaints. The patient has had problems with ear fullness, ear pressure, dizziness, cough, epistaxis nasal drainage, nasal congestion, sinusitis, sinus pressure, postnasal drip, facial pressure and headaches  The symptoms are localized to the ears, nose and right>left orbital, maxillary and frontal sinus. . The patient has had moderate, nagging symptoms. The symptoms have been present for the last several years There have been no identified factors that aggravate the symptoms. There have been no factors that have improved the symptoms. Patient also has sore throat, dental pain and reflux, which is medicated. Patient has not had testing as the CT was denied due to needing more information. She has been on nasal sprays and antibiotics with minimal relief.     She improved somewhat with respect to decrease pressure and congestion when she receives oral antibiotics but she experiences recurrence very quickly upon the discontinuation.     She has had immunotherapy for years and is still on immunotherapy per Dr. Deal.          Past Medical History:   Diagnosis Date   • Back injury    • COVID-19 vaccine series completed     Moderna   • Depression    • GERD (gastroesophageal reflux disease)    • Hyperlipidemia    • Hypertension    • IBS (irritable bowel syndrome)    • Sinusitis        Past Surgical History:   Procedure Laterality Date   • COLONOSCOPY     • CYST REMOVAL     • ENDOSCOPY N/A 10/29/2019    Procedure: ESOPHAGOGASTRODUODENOSCOPY WITH ANESTHESIA;  Surgeon: Berry Turner MD;  Location: [facility]  ENDOSCOPY;  Service: Gastroenterology   • LEEP     • WISDOM TOOTH EXTRACTION         Outpatient Medications Marked as Taking for the 5/25/21 encounter (Office Visit) with Foster Watters MD   Medication Sig Dispense Refill   • ALPRAZolam (XANAX) 0.5 MG tablet Take 0.5 mg by mouth Daily As Needed.     • baclofen (LIORESAL) 10 MG tablet Take 10 mg by mouth 3 (Three) Times a Day.     • budesonide (RINOCORT AQUA) 32 MCG/ACT nasal spray 1 spray into the nostril(s) as directed by provider Daily.     • buPROPion XL (WELLBUTRIN XL) 150 MG 24 hr tablet      • EPINEPHrine (EPIPEN) 0.3 MG/0.3ML solution auto-injector injection epinephrine 0.3 mg/0.3 mL injection, auto-injector     • FLUoxetine (PROzac) 40 MG capsule Take 80 mg by mouth Daily.     • gabapentin (NEURONTIN) 300 MG capsule Take 300 mg by mouth 2 (Two) Times a Day.     • ipratropium (ATROVENT) 0.06 % nasal spray ipratropium bromide 42 mcg (0.06 %) nasal spray   U 2 SPRAYS IEN 2 TO 4 XD     • loratadine-pseudoephedrine (CLARITIN-D 24 HOUR)  MG per 24 hr tablet Take 1 tablet by mouth Daily. 7 tablet 0   • meloxicam (MOBIC) 15 MG tablet Take 15 mg by mouth Daily.     • omeprazole (priLOSEC) 40 MG capsule Take 40 mg by mouth Daily.     • rizatriptan (MAXALT) 10 MG tablet Take 10 mg by mouth Daily As Needed.     • simvastatin (ZOCOR) 40 MG tablet Take 40 mg by mouth Every Night.     • vitamin D (ERGOCALCIFEROL) 1.25 MG (88932 UT) capsule capsule TAKE 1 CAPSULE BY MOUTH 2 TIMES EVERY WEEK AS DIRECTED         Patient has no known allergies.    Family History   Problem Relation Age of Onset   • Cancer Mother    • Diabetes Father    • Breast cancer Neg Hx    • Colon cancer Neg Hx    • Colon polyps Neg Hx        Social History     Socioeconomic History   • Marital status:      Spouse name: Not on file   • Number of children: Not on file   • Years of education: Not on file   • Highest education level: Not on file   Tobacco Use   • Smoking status: Never  Smoker   • Smokeless tobacco: Never Used   Vaping Use   • Vaping Use: Never used   Substance and Sexual Activity   • Alcohol use: Yes     Comment: occ.    • Drug use: No   • Sexual activity: Defer       Review of Systems   Constitutional: Negative.    HENT: Positive for congestion, postnasal drip, rhinorrhea and sinus pressure.    Eyes: Negative.    Respiratory: Negative.    Cardiovascular: Negative.    Gastrointestinal:        Reflux   Endocrine: Negative.    Genitourinary: Negative.    Neurological: Positive for dizziness and headaches.       Vitals:    05/25/21 1407   BP: 138/76   Pulse: 98   Temp: 98.2 °F (36.8 °C)       Body mass index is 39.75 kg/m².    Objective     Physical Exam  CONSTITUTIONAL: Obese, well nourished, well-developed, alert, oriented, in no acute distress     COMMUNICATION AND VOICE: able to communicate normally, normal voice quality    HEAD: normocephalic, no lesions, atraumatic, no tenderness, no masses     FACE: appearance normal, no lesions, no tenderness, no deformities, facial motion symmetric    EYES: ocular motility normal, eyelids normal, orbits normal, no proptosis, conjunctiva normal , pupils equal, round     EARS:  Hearing: hearing to conversational voice intact bilaterally   External Ears: normal bilaterally, no lesions  TMs  AS-clear and intact  AD-clear and intact    NOSE:  External Nose: external nasal structure normal, no tenderness on palpation, no nasal discharge, no lesions, no evidence of trauma, nostrils patent   Intranasal exam: See endoscopy    ORAL:  Lips: upper and lower lips without lesion   OC/OP-clear    NECK:  Inspection and Palpation: neck appearance normal, no masses or tenderness    CHEST/RESPIRATORY: normal respiratory effort     CARDIOVASCULAR: no cyanosis or edema     NEUROLOGICAL/PSYCHIATRIC: oriented to time, place and person, mood normal, affect appropriate, CN II-XII intact grossly    Assessment/Plan   Diagnoses and all orders for this visit:    1.  Chronic maxillary sinusitis (Primary)  Comments:  With possible frontal involvement  Orders:  -     CT Sinus Without Contrast; Future    2. Nasal septal deviation  -     CT Sinus Without Contrast; Future    3. Hypertrophy of both inferior nasal turbinates  -     CT Sinus Without Contrast; Future    4. Non-seasonal allergic rhinitis due to pollen  -     CT Sinus Without Contrast; Future    Other orders  -     amoxicillin (AMOXIL) 500 MG capsule; Take 1 capsule by mouth 3 (Three) Times a Day for 10 days.  Dispense: 30 capsule; Refill: 0      * Surgery not found *  Orders Placed This Encounter   Procedures   • CT Sinus Without Contrast     Standing Status:   Future     Standing Expiration Date:   5/25/2022     Scheduling Instructions:      Image guided protocol     Order Specific Question:   Patient Pregnant     Answer:   Unknown     Return in about 4 weeks (around 6/22/2021).       Patient Instructions   Amoxicillin 500 twice daily 7 days prior to CT scan    Call or return for problems    Follow-up in 3 to 4 weeks  Consider surgical intervention based on CT results as medical therapy has failed to improve the patient's symptoms to any significance despite multiple courses of appropriate antimicrobials intranasal steroid sprays and intranasal decongestants.

## 2021-05-25 ENCOUNTER — OFFICE VISIT (OUTPATIENT)
Dept: OTOLARYNGOLOGY | Facility: CLINIC | Age: 46
End: 2021-05-25

## 2021-05-25 VITALS
WEIGHT: 261.4 LBS | HEART RATE: 98 BPM | HEIGHT: 68 IN | TEMPERATURE: 98.2 F | BODY MASS INDEX: 39.62 KG/M2 | DIASTOLIC BLOOD PRESSURE: 76 MMHG | SYSTOLIC BLOOD PRESSURE: 138 MMHG

## 2021-05-25 DIAGNOSIS — J32.0 CHRONIC MAXILLARY SINUSITIS: Primary | ICD-10-CM

## 2021-05-25 DIAGNOSIS — J34.2 NASAL SEPTAL DEVIATION: ICD-10-CM

## 2021-05-25 DIAGNOSIS — J34.3 HYPERTROPHY OF BOTH INFERIOR NASAL TURBINATES: ICD-10-CM

## 2021-05-25 DIAGNOSIS — J30.1 NON-SEASONAL ALLERGIC RHINITIS DUE TO POLLEN: ICD-10-CM

## 2021-05-25 PROCEDURE — 99213 OFFICE O/P EST LOW 20 MIN: CPT | Performed by: OTOLARYNGOLOGY

## 2021-05-25 PROCEDURE — 31231 NASAL ENDOSCOPY DX: CPT | Performed by: OTOLARYNGOLOGY

## 2021-05-25 RX ORDER — AMOXICILLIN 500 MG/1
500 CAPSULE ORAL 3 TIMES DAILY
Qty: 30 CAPSULE | Refills: 0 | Status: SHIPPED | OUTPATIENT
Start: 2021-05-25 | End: 2021-06-04

## 2021-05-25 NOTE — PATIENT INSTRUCTIONS
Amoxicillin 500 twice daily 7 days prior to CT scan    Call or return for problems    Follow-up in 3 to 4 weeks  Consider surgical intervention based on CT results as medical therapy has failed to improve the patient's symptoms to any significance despite multiple courses of appropriate antimicrobials intranasal steroid sprays and intranasal decongestants.

## 2021-05-25 NOTE — PROGRESS NOTES
PROCEDURE NOTE    Yola Mitchell    DATE OF PROCEDURE: 5/25/2021    PROCEDURE:   Bilateral Diagnostic Rigid Nasal Endoscopy    PREPROCEDURE DIAGNOSIS:   Chronic maxillary sinusitis with possible frontal involvement    POSTPROCEDURE DIAGNOSIS:  SAME    ANESTHESIA:   None    PROCEDURE DESCRIPTION:    With the patient in the chair bilateral diagnostic rigid nasal endoscopy was performed with the Stortz 0° endoscope without difficulty.     An endoscope was passed along the left nasal floor to the nasopharynx.  It was then passed into the region of the middle meatus, middle turbinate, and the sphenoethmoid region. An identical procedure was performed on the right side.  The following findings were noted as stated below:    Findings: Thick clear mucus bilaterally with inferior septal deviation on the left and mid posterior septal deviation on the right with lateralization of the middle turbinate on the right much greater than the left.  No evidence of nasal polyposis and other nasal cavity.  Bilateral inferior turbinate hypertrophy was appreciated    Condition:  Stable.  Patient tolerated procedure well.    Complications:  None  There was no significant bleeding.

## 2021-06-14 ENCOUNTER — HOSPITAL ENCOUNTER (OUTPATIENT)
Dept: CT IMAGING | Facility: HOSPITAL | Age: 46
Discharge: HOME | End: 2021-06-14
Admitting: OTOLARYNGOLOGY

## 2021-06-14 DIAGNOSIS — J34.2 NASAL SEPTAL DEVIATION: ICD-10-CM

## 2021-06-14 DIAGNOSIS — J30.1 NON-SEASONAL ALLERGIC RHINITIS DUE TO POLLEN: ICD-10-CM

## 2021-06-14 DIAGNOSIS — J34.3 HYPERTROPHY OF BOTH INFERIOR NASAL TURBINATES: ICD-10-CM

## 2021-06-14 DIAGNOSIS — J32.0 CHRONIC MAXILLARY SINUSITIS: ICD-10-CM

## 2021-06-14 PROCEDURE — 70486 CT MAXILLOFACIAL W/O DYE: CPT

## 2021-06-28 NOTE — PROGRESS NOTES
YOB: 1975  Location: [facility]  Location Address: [address] [place] [address], [facility], [address] [place] [address]  Location Phone: [phone]    Chief Complaint   Patient presents with   • Follow-up       History of Present Illness  Yola Mitchell is a 45 y.o. female.  Yola Mitchell is here for follow up of of ENT complaints. The patient has had problems with ear fullness, ear pressure, dizziness, cough, epistaxis nasal drainage, nasal congestion, sinusitis, sinus pressure, postnasal drip, facial pressure and headaches  The symptoms are localized to the ears, nose and right>left orbital, maxillary and frontal sinus. . The patient has had moderate, nagging symptoms. The symptoms have been present for the last several years There have been no identified factors that aggravate the symptoms. There have been no factors that have improved the symptoms. Patient also has sore throat, dental pain and reflux, which is medicated. She has had immunotherapy for years and is still on immunotherapy per Dr. Deal. Patient has had CT since last visit.  She has had 2 more episodes of sinusitis since her last evaluation.    I have personally reviewed the information imported into the chart during this visit.      I have personally reviewed the review of systems.                 Study Result    Narrative & Impression   EXAM: CT SINUS WO CONTRAST- - 2021 2:53 PM CDT     HISTORY: Sinusitis, <=12w, uncomplicated; J32.0-Chronic maxillary  sinusitis; J34.2-Deviated nasal septum; J34.3-Hypertrophy of nasal  turbinates; J30.1-Allergic rhinitis due to pollen       COMPARISON: No existing relevant imaging studies available .      DOSE LENGTH PRODUCT: 302 mGy cm. Automated exposure control was also  utilized to decrease patient radiation dose.     TECHNIQUE: Routine protocol CT sinus performed with multiplanar  reformats.     FINDINGS:  SINUSES:   Normal aeration of the paranasal sinuses. No mucosal thickening,  mucoperiosteal  reaction or air-fluid levels.     Bilateral ostiomeatal units, nasofrontal recesses and sphenoethmoidal  recesses are patent.     Deviated nasal septum.     NASOPHARYNX: Limited evaluation due to technique, within normal limits.     EARS: External auditory canals, middle ears and mastoid air cells are  normally aerated.     TEETH: Visualized portion within normal limits.     INTRACRANIAL: Limited in evaluation due to technique. No gross midline  shift.     BONE: No acute fracture.        IMPRESSION:  1. No acute finding.  2. Deviated nasal septum.  This report was finalized on 06/14/2021 15:46 by Dr Kiera Wood MD.             Past Medical History:   Diagnosis Date   • Back injury    • COVID-19 vaccine series completed     Moderna   • Depression    • GERD (gastroesophageal reflux disease)    • Hyperlipidemia    • Hypertension    • IBS (irritable bowel syndrome)    • Sinusitis        Past Surgical History:   Procedure Laterality Date   • COLONOSCOPY     • CYST REMOVAL     • ENDOSCOPY N/A 10/29/2019    Procedure: ESOPHAGOGASTRODUODENOSCOPY WITH ANESTHESIA;  Surgeon: Berry Turner MD;  Location: [facility] ENDOSCOPY;  Service: Gastroenterology   • LEEP     • WISDOM TOOTH EXTRACTION         Outpatient Medications Marked as Taking for the 6/29/21 encounter (Office Visit) with Foster Watters MD   Medication Sig Dispense Refill   • ALPRAZolam (XANAX) 0.5 MG tablet Take 0.5 mg by mouth Daily As Needed.     • baclofen (LIORESAL) 10 MG tablet Take 10 mg by mouth 3 (Three) Times a Day.     • budesonide (RINOCORT AQUA) 32 MCG/ACT nasal spray 1 spray into the nostril(s) as directed by provider Daily.     • buPROPion XL (WELLBUTRIN XL) 150 MG 24 hr tablet      • EPINEPHrine (EPIPEN) 0.3 MG/0.3ML solution auto-injector injection epinephrine 0.3 mg/0.3 mL injection, auto-injector     • FLUoxetine (PROzac) 40 MG capsule Take 80 mg by mouth Daily.     • gabapentin (NEURONTIN) 300 MG capsule Take 300 mg by mouth 2 (Two) Times  a Day.     • ipratropium (ATROVENT) 0.06 % nasal spray ipratropium bromide 42 mcg (0.06 %) nasal spray   U 2 SPRAYS IEN 2 TO 4 XD     • loratadine-pseudoephedrine (CLARITIN-D 24 HOUR)  MG per 24 hr tablet Take 1 tablet by mouth Daily. 7 tablet 0   • meloxicam (MOBIC) 15 MG tablet Take 15 mg by mouth Daily.     • omeprazole (priLOSEC) 40 MG capsule Take 40 mg by mouth Daily.     • rizatriptan (MAXALT) 10 MG tablet Take 10 mg by mouth Daily As Needed.     • simvastatin (ZOCOR) 40 MG tablet Take 40 mg by mouth Every Night.     • vitamin D (ERGOCALCIFEROL) 1.25 MG (04570 UT) capsule capsule TAKE 1 CAPSULE BY MOUTH 2 TIMES EVERY WEEK AS DIRECTED         Patient has no known allergies.    Family History   Problem Relation Age of Onset   • Cancer Mother    • Diabetes Father    • Breast cancer Neg Hx    • Colon cancer Neg Hx    • Colon polyps Neg Hx        Social History     Socioeconomic History   • Marital status:      Spouse name: Not on file   • Number of children: Not on file   • Years of education: Not on file   • Highest education level: Not on file   Tobacco Use   • Smoking status: Never Smoker   • Smokeless tobacco: Never Used   Vaping Use   • Vaping Use: Never used   Substance and Sexual Activity   • Alcohol use: Yes     Comment: occ.    • Drug use: No   • Sexual activity: Defer       Review of Systems   Constitutional: Negative.    HENT: Positive for congestion, postnasal drip and rhinorrhea.         Ear pressure   Eyes: Negative.    Respiratory: Positive for cough.    Cardiovascular: Negative.    Gastrointestinal: Negative.    Endocrine: Negative.    Genitourinary: Negative.    Musculoskeletal: Negative.    Skin: Negative.    Allergic/Immunologic: Negative.    Neurological: Positive for dizziness and headaches.   Hematological: Negative.    Psychiatric/Behavioral: Negative.        Vitals:    06/29/21 1422   BP: 121/74   Pulse: 111   Temp: 98 °F (36.7 °C)       Body mass index is 39.02  kg/m².    Objective     Physical Exam  CONSTITUTIONAL: well nourished, well-developed, alert, oriented, in no acute distress     COMMUNICATION AND VOICE: able to communicate normally, normal voice quality    HEAD: normocephalic, no lesions, atraumatic, no tenderness, no masses     FACE: appearance normal, no lesions, no tenderness, no deformities, facial motion symmetric    EYES: ocular motility normal, eyelids normal, orbits normal, no proptosis, conjunctiva normal , pupils equal, round     EARS:  Hearing: hearing to conversational voice intact bilaterally   External Ears: normal bilaterally, no lesions      NOSE:  External Nose: external nasal structure normal, no tenderness on palpation, no nasal discharge, no lesions, no evidence of trauma, nostrils patent   Intranasal exam: See rigid nasal endoscopy    ORAL:  Lips: upper and lower lips without lesion   OC/OP-clear    NECK:  Inspection and Palpation: neck appearance normal, no masses or tenderness    CHEST/RESPIRATORY: normal respiratory effort     CARDIOVASCULAR: no cyanosis or edema     NEUROLOGICAL/PSYCHIATRIC: oriented to time, place and person, mood normal, affect appropriate, CN II-XII intact grossly    Assessment/Plan   Diagnoses and all orders for this visit:    1. Nasal septal deviation (Primary)  -     Case Request; Standing  -     Comprehensive Metabolic Panel; Future  -     CBC (No Diff); Future  -     ECG 12 Lead; Future  -     XR Chest 2 View; Future  -     Case Request    2. Hypertrophy of both inferior nasal turbinates  -     Case Request; Standing  -     Comprehensive Metabolic Panel; Future  -     CBC (No Diff); Future  -     ECG 12 Lead; Future  -     XR Chest 2 View; Future  -     Case Request    3. Recurrent rhinosinusitis  -     Case Request; Standing  -     Comprehensive Metabolic Panel; Future  -     CBC (No Diff); Future  -     ECG 12 Lead; Future  -     XR Chest 2 View; Future  -     Case Request    Other orders  -     Follow Anesthesia  Guidelines / Standing Orders; Future  -     Obtain Informed Consent  -     Follow Anesthesia Guidelines / Standing Orders; Standing  -     Verify NPO Status; Standing  -     NPO Diet; Standing  -     Obtain Informed Consent; Standing  -     SCD (Sequential Compression Device) - To Be Placed on Patient in Pre-Op; Standing      Septoplasty with bilateral inferior turbinate reduction via Coblation and bilateral maxillary antrostomy (N/A)  Orders Placed This Encounter   Procedures   • XR Chest 2 View     Standing Status:   Future     Standing Expiration Date:   6/29/2022     Order Specific Question:   Reason for Exam:     Answer:   Septoplasty with bilateral inferior turbinate reduction via Coblation and bilateral maxillary antrostomy     Order Specific Question:   Patient Pregnant     Answer:   Unknown   • Comprehensive Metabolic Panel     Standing Status:   Future     Standing Expiration Date:   6/29/2022     Order Specific Question:   Release to patient     Answer:   Immediate   • CBC (No Diff)     Standing Status:   Future     Standing Expiration Date:   6/29/2022     Order Specific Question:   Release to patient     Answer:   Immediate   • Follow Anesthesia Guidelines / Standing Orders     Standing Status:   Future   • Obtain Informed Consent     Order Specific Question:   Informed Consent Given For     Answer:   ENDOSCOPIC FUNCTIONAL SINUS SURGERY W TRACKING, SINUPLASTY, SEPTOPLASTY, RESECT INFERIOR TURBINATES VIA COBLATION   • ECG 12 Lead     Standing Status:   Future     Standing Expiration Date:   6/29/2022     Order Specific Question:   Reason for Exam:     Answer:   preop     Return for postop.       Patient Instructions   Septoplasty and bilateral inferior turbinoplasty via Coblation with bilateral maxillary antrostomy: A septoplasty and inferior turbinoplasty were recommended. The risks and benefits were explained including but not limited to pain, bleeding, infection, risks of the general anesthesia,  continued septal deviation, crusting, congestion and septal perforation. Possibilities of continued preoperative symptoms and the possible need for revision surgery and or medical therapy were discussed. Alternatives were discussed. No guarantees were made or implied. Questions were asked appropriately answered.      Patient and family were instructed on the proper use of scheduled prescription drugs including their impact on driving and the potential effects during pregnancy.  The potential for overdose was discussed and their safe storage and proper disposal.  The website www.[employer].ky.gov which contains other materials in this regard.

## 2021-06-29 ENCOUNTER — OFFICE VISIT (OUTPATIENT)
Dept: OTOLARYNGOLOGY | Facility: CLINIC | Age: 46
End: 2021-06-29

## 2021-06-29 VITALS
HEIGHT: 68 IN | HEART RATE: 111 BPM | TEMPERATURE: 98 F | SYSTOLIC BLOOD PRESSURE: 121 MMHG | BODY MASS INDEX: 38.89 KG/M2 | DIASTOLIC BLOOD PRESSURE: 74 MMHG | WEIGHT: 256.6 LBS

## 2021-06-29 DIAGNOSIS — J34.3 HYPERTROPHY OF BOTH INFERIOR NASAL TURBINATES: ICD-10-CM

## 2021-06-29 DIAGNOSIS — J34.2 NASAL SEPTAL DEVIATION: Primary | ICD-10-CM

## 2021-06-29 DIAGNOSIS — J32.9 RECURRENT RHINOSINUSITIS: ICD-10-CM

## 2021-06-29 PROCEDURE — 31231 NASAL ENDOSCOPY DX: CPT | Performed by: OTOLARYNGOLOGY

## 2021-06-29 PROCEDURE — 99214 OFFICE O/P EST MOD 30 MIN: CPT | Performed by: OTOLARYNGOLOGY

## 2021-06-29 NOTE — PROGRESS NOTES
PROCEDURE NOTE    Yola Mitchell    DATE OF PROCEDURE: 6/29/2021    PROCEDURE:   Bilateral Diagnostic Rigid Nasal Endoscopy    PREPROCEDURE DIAGNOSIS:   Recurrent acute sinusitis associated with septal deviation    POSTPROCEDURE DIAGNOSIS:  SAME    ANESTHESIA:   None    PROCEDURE DESCRIPTION:    With the patient in the chair bilateral diagnostic rigid nasal endoscopy was performed with the Stortz 0° endoscope without difficulty.     An endoscope was passed along the left nasal floor to the nasopharynx.  It was then passed into the region of the middle meatus, middle turbinate, and the sphenoethmoid region. An identical procedure was performed on the right side.  The following findings were noted as stated below:    Findings: Left septal spurring with impaction of the inferior turbinate associated with the right high riding septal deviation.  No intranasal polyposis is appreciated but bilateral middle turbinate lateralization appreciated with minimal edema of the middle meatus.  Significant bilateral inferior turbinate hypertrophy is appreciated.  Condition:  Stable.  Patient tolerated procedure well.    Complications:  None  There was no significant bleeding.

## 2021-06-29 NOTE — PATIENT INSTRUCTIONS
Septoplasty and bilateral inferior turbinoplasty via Coblation with bilateral maxillary antrostomy: A septoplasty and inferior turbinoplasty were recommended. The risks and benefits were explained including but not limited to pain, bleeding, infection, risks of the general anesthesia, continued septal deviation, crusting, congestion and septal perforation. Possibilities of continued preoperative symptoms and the possible need for revision surgery and or medical therapy were discussed. Alternatives were discussed. No guarantees were made or implied. Questions were asked appropriately answered.      Patient and family were instructed on the proper use of scheduled prescription drugs including their impact on driving and the potential effects during pregnancy.  The potential for overdose was discussed and their safe storage and proper disposal.  The website www.[employer].ky.gov which contains other materials in this regard.

## 2021-07-06 ENCOUNTER — TRANSCRIBE ORDERS (OUTPATIENT)
Dept: ADMINISTRATIVE | Facility: HOSPITAL | Age: 46
End: 2021-07-06

## 2021-07-06 DIAGNOSIS — Z11.59 SCREENING FOR VIRAL DISEASE: Primary | ICD-10-CM

## 2021-07-12 ENCOUNTER — PRE-ADMISSION TESTING (OUTPATIENT)
Dept: PREADMISSION TESTING | Facility: HOSPITAL | Age: 46
End: 2021-07-12

## 2021-07-12 ENCOUNTER — HOSPITAL ENCOUNTER (OUTPATIENT)
Dept: GENERAL RADIOLOGY | Facility: HOSPITAL | Age: 46
Discharge: HOME | End: 2021-07-12

## 2021-07-12 ENCOUNTER — LAB (OUTPATIENT)
Dept: LAB | Facility: HOSPITAL | Age: 46
End: 2021-07-12

## 2021-07-12 VITALS
SYSTOLIC BLOOD PRESSURE: 133 MMHG | HEIGHT: 67 IN | BODY MASS INDEX: 40.14 KG/M2 | OXYGEN SATURATION: 100 % | RESPIRATION RATE: 18 BRPM | WEIGHT: 255.73 LBS | HEART RATE: 83 BPM | DIASTOLIC BLOOD PRESSURE: 83 MMHG

## 2021-07-12 DIAGNOSIS — J32.9 RECURRENT RHINOSINUSITIS: ICD-10-CM

## 2021-07-12 DIAGNOSIS — J34.3 HYPERTROPHY OF BOTH INFERIOR NASAL TURBINATES: ICD-10-CM

## 2021-07-12 DIAGNOSIS — J34.2 NASAL SEPTAL DEVIATION: ICD-10-CM

## 2021-07-12 LAB
ALBUMIN SERPL-MCNC: 4.3 G/DL (ref 3.5–5.2)
ALBUMIN/GLOB SERPL: 1.4 G/DL
ALP SERPL-CCNC: 84 U/L (ref 39–117)
ALT SERPL W P-5'-P-CCNC: 14 U/L (ref 1–33)
ANION GAP SERPL CALCULATED.3IONS-SCNC: 7 MMOL/L (ref 5–15)
AST SERPL-CCNC: 18 U/L (ref 1–32)
BILIRUB SERPL-MCNC: 0.3 MG/DL (ref 0–1.2)
BUN SERPL-MCNC: 14 MG/DL (ref 6–20)
BUN/CREAT SERPL: 17.9 (ref 7–25)
CALCIUM SPEC-SCNC: 8.7 MG/DL (ref 8.6–10.5)
CHLORIDE SERPL-SCNC: 107 MMOL/L (ref 98–107)
CO2 SERPL-SCNC: 26 MMOL/L (ref 22–29)
CREAT SERPL-MCNC: 0.78 MG/DL (ref 0.57–1)
DEPRECATED RDW RBC AUTO: 43.9 FL (ref 37–54)
ERYTHROCYTE [DISTWIDTH] IN BLOOD BY AUTOMATED COUNT: 14.6 % (ref 12.3–15.4)
GFR SERPL CREATININE-BSD FRML MDRD: 79 ML/MIN/1.73
GLOBULIN UR ELPH-MCNC: 3 GM/DL
GLUCOSE SERPL-MCNC: 85 MG/DL (ref 65–99)
HCT VFR BLD AUTO: 39 % (ref 34–46.6)
HGB BLD-MCNC: 12.4 G/DL (ref 12–15.9)
MCH RBC QN AUTO: 26.6 PG (ref 26.6–33)
MCHC RBC AUTO-ENTMCNC: 31.8 G/DL (ref 31.5–35.7)
MCV RBC AUTO: 83.7 FL (ref 79–97)
PLATELET # BLD AUTO: 285 10*3/MM3 (ref 140–450)
PMV BLD AUTO: 9.5 FL (ref 6–12)
POTASSIUM SERPL-SCNC: 4.1 MMOL/L (ref 3.5–5.2)
PROT SERPL-MCNC: 7.3 G/DL (ref 6–8.5)
RBC # BLD AUTO: 4.66 10*6/MM3 (ref 3.77–5.28)
SARS-COV-2 ORF1AB RESP QL NAA+PROBE: NOT DETECTED
SODIUM SERPL-SCNC: 140 MMOL/L (ref 136–145)
WBC # BLD AUTO: 6.55 10*3/MM3 (ref 3.4–10.8)

## 2021-07-12 PROCEDURE — 93010 ELECTROCARDIOGRAM REPORT: CPT | Performed by: INTERNAL MEDICINE

## 2021-07-12 PROCEDURE — 36415 COLL VENOUS BLD VENIPUNCTURE: CPT

## 2021-07-12 PROCEDURE — 85027 COMPLETE CBC AUTOMATED: CPT

## 2021-07-12 PROCEDURE — U0004 COV-19 TEST NON-CDC HGH THRU: HCPCS | Performed by: OTOLARYNGOLOGY

## 2021-07-12 PROCEDURE — 93005 ELECTROCARDIOGRAM TRACING: CPT

## 2021-07-12 PROCEDURE — 80053 COMPREHEN METABOLIC PANEL: CPT

## 2021-07-12 PROCEDURE — C9803 HOPD COVID-19 SPEC COLLECT: HCPCS | Performed by: OTOLARYNGOLOGY

## 2021-07-12 PROCEDURE — 71046 X-RAY EXAM CHEST 2 VIEWS: CPT

## 2021-07-14 ENCOUNTER — HOSPITAL ENCOUNTER (OUTPATIENT)
Facility: HOSPITAL | Age: 46
Setting detail: HOSPITAL OUTPATIENT SURGERY
Discharge: HOME | End: 2021-07-14
Attending: OTOLARYNGOLOGY | Admitting: OTOLARYNGOLOGY

## 2021-07-14 ENCOUNTER — ANESTHESIA (OUTPATIENT)
Dept: PERIOP | Facility: HOSPITAL | Age: 46
End: 2021-07-14

## 2021-07-14 ENCOUNTER — ANESTHESIA EVENT (OUTPATIENT)
Dept: PERIOP | Facility: HOSPITAL | Age: 46
End: 2021-07-14

## 2021-07-14 VITALS
TEMPERATURE: 97.8 F | SYSTOLIC BLOOD PRESSURE: 154 MMHG | DIASTOLIC BLOOD PRESSURE: 88 MMHG | OXYGEN SATURATION: 98 % | HEART RATE: 81 BPM | RESPIRATION RATE: 16 BRPM

## 2021-07-14 DIAGNOSIS — J32.0 CHRONIC MAXILLARY SINUSITIS: ICD-10-CM

## 2021-07-14 DIAGNOSIS — J34.2 NASAL SEPTAL DEVIATION: ICD-10-CM

## 2021-07-14 DIAGNOSIS — J30.1 NON-SEASONAL ALLERGIC RHINITIS DUE TO POLLEN: ICD-10-CM

## 2021-07-14 DIAGNOSIS — J34.3 HYPERTROPHY OF BOTH INFERIOR NASAL TURBINATES: ICD-10-CM

## 2021-07-14 DIAGNOSIS — J32.9 RECURRENT RHINOSINUSITIS: Primary | ICD-10-CM

## 2021-07-14 LAB
B-HCG UR QL: NEGATIVE
QT INTERVAL: 384 MS
QTC INTERVAL: 440 MS

## 2021-07-14 PROCEDURE — 25010000002 DEXAMETHASONE PER 1 MG: Performed by: ANESTHESIOLOGY

## 2021-07-14 PROCEDURE — 25010000002 FENTANYL CITRATE (PF) 250 MCG/5ML SOLUTION: Performed by: NURSE ANESTHETIST, CERTIFIED REGISTERED

## 2021-07-14 PROCEDURE — 25010000002 COCAINE HCL 40 MG/ML SOLUTION: Performed by: OTOLARYNGOLOGY

## 2021-07-14 PROCEDURE — 25010000002 PROPOFOL 10 MG/ML EMULSION: Performed by: NURSE ANESTHETIST, CERTIFIED REGISTERED

## 2021-07-14 PROCEDURE — 25010000002 ONDANSETRON PER 1 MG: Performed by: NURSE ANESTHETIST, CERTIFIED REGISTERED

## 2021-07-14 PROCEDURE — 25010000002 DEXAMETHASONE PER 1 MG: Performed by: NURSE ANESTHETIST, CERTIFIED REGISTERED

## 2021-07-14 PROCEDURE — 30520 REPAIR OF NASAL SEPTUM: CPT | Performed by: OTOLARYNGOLOGY

## 2021-07-14 PROCEDURE — C1889 IMPLANT/INSERT DEVICE, NOC: HCPCS | Performed by: OTOLARYNGOLOGY

## 2021-07-14 PROCEDURE — C9046 COCAINE HCL NASAL SOLUTION: HCPCS | Performed by: OTOLARYNGOLOGY

## 2021-07-14 PROCEDURE — 88305 TISSUE EXAM BY PATHOLOGIST: CPT | Performed by: OTOLARYNGOLOGY

## 2021-07-14 PROCEDURE — 25010000002 MIDAZOLAM PER 1 MG: Performed by: ANESTHESIOLOGY

## 2021-07-14 PROCEDURE — 88311 DECALCIFY TISSUE: CPT | Performed by: OTOLARYNGOLOGY

## 2021-07-14 PROCEDURE — 31256 EXPLORATION MAXILLARY SINUS: CPT | Performed by: OTOLARYNGOLOGY

## 2021-07-14 PROCEDURE — 81025 URINE PREGNANCY TEST: CPT | Performed by: OTOLARYNGOLOGY

## 2021-07-14 PROCEDURE — 30802 ABLATE INF TURBINATE SUBMUC: CPT | Performed by: OTOLARYNGOLOGY

## 2021-07-14 DEVICE — PROPEL MINI SINUS IMPLANT
Type: IMPLANTABLE DEVICE | Site: NOSE | Status: FUNCTIONAL
Brand: PROPEL MINI

## 2021-07-14 RX ORDER — COCAINE HYDROCHLORIDE 40 MG/ML
SOLUTION NASAL
Status: DISCONTINUED
Start: 2021-07-14 | End: 2021-07-14 | Stop reason: HOSPADM

## 2021-07-14 RX ORDER — ROCURONIUM BROMIDE 10 MG/ML
INJECTION, SOLUTION INTRAVENOUS AS NEEDED
Status: DISCONTINUED | OUTPATIENT
Start: 2021-07-14 | End: 2021-07-14 | Stop reason: SURG

## 2021-07-14 RX ORDER — FENTANYL CITRATE 50 UG/ML
25 INJECTION, SOLUTION INTRAMUSCULAR; INTRAVENOUS
Status: DISCONTINUED | OUTPATIENT
Start: 2021-07-14 | End: 2021-07-14 | Stop reason: HOSPADM

## 2021-07-14 RX ORDER — SUCCINYLCHOLINE/SOD CL,ISO/PF 200MG/10ML
SYRINGE (ML) INTRAVENOUS AS NEEDED
Status: DISCONTINUED | OUTPATIENT
Start: 2021-07-14 | End: 2021-07-14 | Stop reason: SURG

## 2021-07-14 RX ORDER — LIDOCAINE HYDROCHLORIDE 10 MG/ML
0.5 INJECTION, SOLUTION EPIDURAL; INFILTRATION; INTRACAUDAL; PERINEURAL ONCE AS NEEDED
Status: DISCONTINUED | OUTPATIENT
Start: 2021-07-14 | End: 2021-07-14 | Stop reason: HOSPADM

## 2021-07-14 RX ORDER — HYDROCODONE BITARTRATE AND ACETAMINOPHEN 5; 325 MG/1; MG/1
1 TABLET ORAL ONCE AS NEEDED
Status: DISCONTINUED | OUTPATIENT
Start: 2021-07-14 | End: 2021-07-14 | Stop reason: HOSPADM

## 2021-07-14 RX ORDER — FENTANYL CITRATE 50 UG/ML
INJECTION, SOLUTION INTRAMUSCULAR; INTRAVENOUS AS NEEDED
Status: DISCONTINUED | OUTPATIENT
Start: 2021-07-14 | End: 2021-07-14 | Stop reason: SURG

## 2021-07-14 RX ORDER — IBUPROFEN 600 MG/1
600 TABLET ORAL ONCE AS NEEDED
Status: DISCONTINUED | OUTPATIENT
Start: 2021-07-14 | End: 2021-07-14 | Stop reason: HOSPADM

## 2021-07-14 RX ORDER — DEXAMETHASONE SODIUM PHOSPHATE 4 MG/ML
4 INJECTION, SOLUTION INTRA-ARTICULAR; INTRALESIONAL; INTRAMUSCULAR; INTRAVENOUS; SOFT TISSUE ONCE AS NEEDED
Status: COMPLETED | OUTPATIENT
Start: 2021-07-14 | End: 2021-07-14

## 2021-07-14 RX ORDER — DEXTROSE MONOHYDRATE 25 G/50ML
12.5 INJECTION, SOLUTION INTRAVENOUS AS NEEDED
Status: DISCONTINUED | OUTPATIENT
Start: 2021-07-14 | End: 2021-07-14 | Stop reason: HOSPADM

## 2021-07-14 RX ORDER — OXYCODONE AND ACETAMINOPHEN 7.5; 325 MG/1; MG/1
2 TABLET ORAL EVERY 4 HOURS PRN
Status: DISCONTINUED | OUTPATIENT
Start: 2021-07-14 | End: 2021-07-14 | Stop reason: HOSPADM

## 2021-07-14 RX ORDER — LIDOCAINE HYDROCHLORIDE 20 MG/ML
INJECTION, SOLUTION EPIDURAL; INFILTRATION; INTRACAUDAL; PERINEURAL AS NEEDED
Status: DISCONTINUED | OUTPATIENT
Start: 2021-07-14 | End: 2021-07-14 | Stop reason: SURG

## 2021-07-14 RX ORDER — COCAINE HYDROCHLORIDE 40 MG/ML
SOLUTION NASAL AS NEEDED
Status: DISCONTINUED | OUTPATIENT
Start: 2021-07-14 | End: 2021-07-14 | Stop reason: HOSPADM

## 2021-07-14 RX ORDER — FLUMAZENIL 0.1 MG/ML
0.2 INJECTION INTRAVENOUS AS NEEDED
Status: DISCONTINUED | OUTPATIENT
Start: 2021-07-14 | End: 2021-07-14 | Stop reason: HOSPADM

## 2021-07-14 RX ORDER — SODIUM CHLORIDE 0.9 % (FLUSH) 0.9 %
3 SYRINGE (ML) INJECTION AS NEEDED
Status: DISCONTINUED | OUTPATIENT
Start: 2021-07-14 | End: 2021-07-14 | Stop reason: HOSPADM

## 2021-07-14 RX ORDER — OXYMETAZOLINE HYDROCHLORIDE 0.05 G/100ML
2 SPRAY NASAL
Status: COMPLETED | OUTPATIENT
Start: 2021-07-14 | End: 2021-07-14

## 2021-07-14 RX ORDER — OXYCODONE AND ACETAMINOPHEN 10; 325 MG/1; MG/1
1 TABLET ORAL ONCE AS NEEDED
Status: COMPLETED | OUTPATIENT
Start: 2021-07-14 | End: 2021-07-14

## 2021-07-14 RX ORDER — ONDANSETRON 2 MG/ML
INJECTION INTRAMUSCULAR; INTRAVENOUS AS NEEDED
Status: DISCONTINUED | OUTPATIENT
Start: 2021-07-14 | End: 2021-07-14 | Stop reason: SURG

## 2021-07-14 RX ORDER — DEXAMETHASONE SODIUM PHOSPHATE 4 MG/ML
INJECTION, SOLUTION INTRA-ARTICULAR; INTRALESIONAL; INTRAMUSCULAR; INTRAVENOUS; SOFT TISSUE AS NEEDED
Status: DISCONTINUED | OUTPATIENT
Start: 2021-07-14 | End: 2021-07-14 | Stop reason: SURG

## 2021-07-14 RX ORDER — MAGNESIUM HYDROXIDE 1200 MG/15ML
LIQUID ORAL AS NEEDED
Status: DISCONTINUED | OUTPATIENT
Start: 2021-07-14 | End: 2021-07-14 | Stop reason: HOSPADM

## 2021-07-14 RX ORDER — NALOXONE HCL 0.4 MG/ML
0.4 VIAL (ML) INJECTION AS NEEDED
Status: DISCONTINUED | OUTPATIENT
Start: 2021-07-14 | End: 2021-07-14 | Stop reason: HOSPADM

## 2021-07-14 RX ORDER — SODIUM CHLORIDE 0.9 % (FLUSH) 0.9 %
10 SYRINGE (ML) INJECTION AS NEEDED
Status: DISCONTINUED | OUTPATIENT
Start: 2021-07-14 | End: 2021-07-14 | Stop reason: HOSPADM

## 2021-07-14 RX ORDER — ONDANSETRON 2 MG/ML
4 INJECTION INTRAMUSCULAR; INTRAVENOUS ONCE AS NEEDED
Status: DISCONTINUED | OUTPATIENT
Start: 2021-07-14 | End: 2021-07-14 | Stop reason: HOSPADM

## 2021-07-14 RX ORDER — AMOXICILLIN 500 MG/1
500 CAPSULE ORAL 3 TIMES DAILY
Qty: 30 CAPSULE | Refills: 0 | Status: SHIPPED | OUTPATIENT
Start: 2021-07-14 | End: 2021-07-24

## 2021-07-14 RX ORDER — PROPOFOL 10 MG/ML
VIAL (ML) INTRAVENOUS AS NEEDED
Status: DISCONTINUED | OUTPATIENT
Start: 2021-07-14 | End: 2021-07-14 | Stop reason: SURG

## 2021-07-14 RX ORDER — MIDAZOLAM HYDROCHLORIDE 1 MG/ML
1 INJECTION INTRAMUSCULAR; INTRAVENOUS
Status: DISCONTINUED | OUTPATIENT
Start: 2021-07-14 | End: 2021-07-14 | Stop reason: HOSPADM

## 2021-07-14 RX ORDER — SODIUM CHLORIDE, SODIUM LACTATE, POTASSIUM CHLORIDE, CALCIUM CHLORIDE 600; 310; 30; 20 MG/100ML; MG/100ML; MG/100ML; MG/100ML
1000 INJECTION, SOLUTION INTRAVENOUS CONTINUOUS
Status: DISCONTINUED | OUTPATIENT
Start: 2021-07-14 | End: 2021-07-14 | Stop reason: HOSPADM

## 2021-07-14 RX ORDER — LIDOCAINE HYDROCHLORIDE AND EPINEPHRINE 10; 10 MG/ML; UG/ML
INJECTION, SOLUTION INFILTRATION; PERINEURAL AS NEEDED
Status: DISCONTINUED | OUTPATIENT
Start: 2021-07-14 | End: 2021-07-14 | Stop reason: HOSPADM

## 2021-07-14 RX ORDER — LABETALOL HYDROCHLORIDE 5 MG/ML
5 INJECTION, SOLUTION INTRAVENOUS
Status: DISCONTINUED | OUTPATIENT
Start: 2021-07-14 | End: 2021-07-14 | Stop reason: HOSPADM

## 2021-07-14 RX ORDER — SODIUM CHLORIDE, SODIUM LACTATE, POTASSIUM CHLORIDE, CALCIUM CHLORIDE 600; 310; 30; 20 MG/100ML; MG/100ML; MG/100ML; MG/100ML
9 INJECTION, SOLUTION INTRAVENOUS CONTINUOUS
Status: DISCONTINUED | OUTPATIENT
Start: 2021-07-14 | End: 2021-07-14 | Stop reason: HOSPADM

## 2021-07-14 RX ORDER — HYDROCODONE BITARTRATE AND ACETAMINOPHEN 5; 325 MG/1; MG/1
1-2 TABLET ORAL EVERY 4 HOURS PRN
Qty: 8 TABLET | Refills: 0 | Status: SHIPPED | OUTPATIENT
Start: 2021-07-14 | End: 2021-07-22

## 2021-07-14 RX ORDER — SODIUM CHLORIDE 0.9 % (FLUSH) 0.9 %
10 SYRINGE (ML) INJECTION EVERY 12 HOURS SCHEDULED
Status: DISCONTINUED | OUTPATIENT
Start: 2021-07-14 | End: 2021-07-14 | Stop reason: HOSPADM

## 2021-07-14 RX ORDER — SCOLOPAMINE TRANSDERMAL SYSTEM 1 MG/1
1 PATCH, EXTENDED RELEASE TRANSDERMAL CONTINUOUS
Status: DISCONTINUED | OUTPATIENT
Start: 2021-07-14 | End: 2021-07-14 | Stop reason: HOSPADM

## 2021-07-14 RX ADMIN — FENTANYL CITRATE 50 MCG: 50 INJECTION, SOLUTION INTRAMUSCULAR; INTRAVENOUS at 07:56

## 2021-07-14 RX ADMIN — LIDOCAINE HYDROCHLORIDE 100 MG: 20 INJECTION, SOLUTION EPIDURAL; INFILTRATION; INTRACAUDAL; PERINEURAL at 07:42

## 2021-07-14 RX ADMIN — OXYCODONE AND ACETAMINOPHEN 1 TABLET: 325; 10 TABLET ORAL at 10:01

## 2021-07-14 RX ADMIN — FENTANYL CITRATE 50 MCG: 50 INJECTION, SOLUTION INTRAMUSCULAR; INTRAVENOUS at 08:09

## 2021-07-14 RX ADMIN — Medication 2 SPRAY: at 07:02

## 2021-07-14 RX ADMIN — ROCURONIUM BROMIDE 5 MG: 10 INJECTION INTRAVENOUS at 07:42

## 2021-07-14 RX ADMIN — FENTANYL CITRATE 50 MCG: 50 INJECTION, SOLUTION INTRAMUSCULAR; INTRAVENOUS at 08:38

## 2021-07-14 RX ADMIN — Medication 2 SPRAY: at 07:07

## 2021-07-14 RX ADMIN — Medication 2 SPRAY: at 07:15

## 2021-07-14 RX ADMIN — FENTANYL CITRATE 100 MCG: 50 INJECTION, SOLUTION INTRAMUSCULAR; INTRAVENOUS at 07:42

## 2021-07-14 RX ADMIN — SODIUM CHLORIDE, POTASSIUM CHLORIDE, SODIUM LACTATE AND CALCIUM CHLORIDE 1000 ML: 600; 310; 30; 20 INJECTION, SOLUTION INTRAVENOUS at 06:23

## 2021-07-14 RX ADMIN — ONDANSETRON 4 MG: 2 INJECTION INTRAMUSCULAR; INTRAVENOUS at 08:09

## 2021-07-14 RX ADMIN — PROPOFOL 180 MG: 10 INJECTION, EMULSION INTRAVENOUS at 07:42

## 2021-07-14 RX ADMIN — DEXAMETHASONE SODIUM PHOSPHATE 4 MG: 4 INJECTION, SOLUTION INTRA-ARTICULAR; INTRALESIONAL; INTRAMUSCULAR; INTRAVENOUS; SOFT TISSUE at 07:02

## 2021-07-14 RX ADMIN — SCOLOPAMINE TRANSDERMAL SYSTEM 1 PATCH: 1 PATCH, EXTENDED RELEASE TRANSDERMAL at 07:02

## 2021-07-14 RX ADMIN — MIDAZOLAM 1 MG: 1 INJECTION INTRAMUSCULAR; INTRAVENOUS at 07:35

## 2021-07-14 RX ADMIN — Medication 180 MG: at 07:42

## 2021-07-14 RX ADMIN — DEXAMETHASONE SODIUM PHOSPHATE 4 MG: 4 INJECTION, SOLUTION INTRA-ARTICULAR; INTRALESIONAL; INTRAMUSCULAR; INTRAVENOUS; SOFT TISSUE at 08:09

## 2021-07-14 NOTE — ANESTHESIA PREPROCEDURE EVALUATION
Anesthesia Evaluation     Patient summary reviewed   no history of anesthetic complications:  NPO Solid Status: > 8 hours             Airway   Mallampati: II  TM distance: >3 FB  Neck ROM: full  Dental      Pulmonary    (-) COPD, asthma, sleep apnea, not a smoker  Cardiovascular   Exercise tolerance: excellent (>7 METS)    (+) hyperlipidemia,   (-) pacemaker, past MI, angina, cardiac stents      Neuro/Psych  (-) seizures, TIA, CVA  GI/Hepatic/Renal/Endo    (+) obesity,  GERD,    (-) liver disease, no renal disease, diabetes    Musculoskeletal     Abdominal    Substance History      OB/GYN          Other                        Anesthesia Plan    ASA 2     general     intravenous induction     Anesthetic plan, all risks, benefits, and alternatives have been provided, discussed and informed consent has been obtained with: patient.

## 2021-07-14 NOTE — ANESTHESIA PROCEDURE NOTES
Airway  Urgency: elective    Date/Time: 7/14/2021 7:43 AM  Airway not difficult    General Information and Staff    Patient location during procedure: OR  CRNA: Keyur Galeas CRNA    Indications and Patient Condition  Indications for airway management: airway protection    Preoxygenated: yes  Mask difficulty assessment: 1 - vent by mask    Final Airway Details  Final airway type: endotracheal airway      Successful airway: ETT  Cuffed: yes   Successful intubation technique: video laryngoscopy  Facilitating devices/methods: intubating stylet  Endotracheal tube insertion site: oral  Blade: Sterling  Blade size: 3  ETT size (mm): 7.0  Cormack-Lehane Classification: grade I - full view of glottis  Placement verified by: chest auscultation, capnometry and palpation of cuff   Cuff volume (mL): 6  Measured from: lips  ETT/EBT  to lips (cm): 19  Number of attempts at approach: 1  Assessment: lips, teeth, and gum same as pre-op and atraumatic intubation

## 2021-07-14 NOTE — ANESTHESIA POSTPROCEDURE EVALUATION
Patient: Yola Mitchell    Procedure Summary     Date: 07/14/21 Room / Location:  [facility] OR  /  [facility]    Anesthesia Start: 0736 Anesthesia Stop: 0855    Procedure: Septoplasty with bilateral inferior turbinate reduction via Coblation and bilateral maxillary antrostomy (N/A Nose) Diagnosis:       Nasal septal deviation      Hypertrophy of both inferior nasal turbinates      Recurrent rhinosinusitis      (Nasal septal deviation [J34.2])      (Hypertrophy of both inferior nasal turbinates [J34.3])      (Recurrent rhinosinusitis [J32.9])    Surgeons: Foster Watters MD Provider: Keyur Galeas CRNA    Anesthesia Type: general ASA Status: 2          Anesthesia Type: general    Vitals  Vitals Value Taken Time   /83 07/14/21 0945   Temp 97.8 °F (36.6 °C) 07/14/21 0935   Pulse 87 07/14/21 0947   Resp 16 07/14/21 0941   SpO2 96 % 07/14/21 0947   Vitals shown include unvalidated device data.        Post Anesthesia Care and Evaluation    Patient location during evaluation: PACU  Patient participation: complete - patient participated  Level of consciousness: awake and alert  Pain management: adequate  Airway patency: patent  Anesthetic complications: No anesthetic complications  PONV Status: none  Cardiovascular status: acceptable and hemodynamically stable  Respiratory status: acceptable  Hydration status: acceptable    Comments: Blood pressure 148/97, pulse 87, temperature 97.8 °F (36.6 °C), temperature source Temporal, resp. rate 16, SpO2 98 %, not currently breastfeeding.    Patient discharged from PACU based upon Brisa score. Please see RN notes for further details

## 2021-07-15 LAB
CYTO UR: NORMAL
LAB AP CASE REPORT: NORMAL
PATH REPORT.FINAL DX SPEC: NORMAL
PATH REPORT.GROSS SPEC: NORMAL

## 2021-08-02 ENCOUNTER — OFFICE VISIT (OUTPATIENT)
Dept: OTOLARYNGOLOGY | Facility: CLINIC | Age: 46
End: 2021-08-02

## 2021-08-02 VITALS
DIASTOLIC BLOOD PRESSURE: 92 MMHG | BODY MASS INDEX: 40.12 KG/M2 | WEIGHT: 255.6 LBS | SYSTOLIC BLOOD PRESSURE: 141 MMHG | HEIGHT: 67 IN | TEMPERATURE: 97.3 F | HEART RATE: 106 BPM

## 2021-08-02 DIAGNOSIS — J30.1 NON-SEASONAL ALLERGIC RHINITIS DUE TO POLLEN: ICD-10-CM

## 2021-08-02 DIAGNOSIS — J32.9 RECURRENT RHINOSINUSITIS: ICD-10-CM

## 2021-08-02 DIAGNOSIS — J34.3 HYPERTROPHY OF BOTH INFERIOR NASAL TURBINATES: ICD-10-CM

## 2021-08-02 DIAGNOSIS — J32.0 CHRONIC MAXILLARY SINUSITIS: Primary | ICD-10-CM

## 2021-08-02 PROCEDURE — 31237 NSL/SINS NDSC SURG BX POLYPC: CPT | Performed by: OTOLARYNGOLOGY

## 2021-08-02 PROCEDURE — 99024 POSTOP FOLLOW-UP VISIT: CPT | Performed by: OTOLARYNGOLOGY

## 2021-08-02 NOTE — PROGRESS NOTES
Procedure Note    Yola Mitchell    DATE OF PROCEDURE: 8/2/2021    PROCEDURE:   Bilateral Rigid Nasal Endoscopy with debridement     PREPROCEDURE DIAGNOSIS:   Chronic rhinosinusitis S/P septoplasty and ITR  Bilateral maxillary antrostomy     POSTPROCEDURE DIAGNOSIS:  SAME     ANESTHESIA:   None       Procedure Details:    After topical anesthesia and decongestion, the patient was placed in the sitting position.  An endoscope was passed along the left nasal floor to the nasopharynx.  It was then passed into the region of the middle meatus, middle turbinate, and the sphenoethmoid region. An identical procedure was performed on the right side.  The following findings were noted as stated below:    Findings: Moderate debris on the left with partially disrupted propel stent.  It was removed piecemeal and though 10 to 15% of the stent remained her antrostomy was patent no inflammation was otherwise noted.  On the right stent looked in good position but there was moderate clear mucoid drainage from the antrostomy which appeared patent.  No evidence of recidivistic polyposis was noted.    Condition:  Stable.  Patient tolerated procedure well.    Complications:  None

## 2021-08-02 NOTE — PROGRESS NOTES
YOB: 1975  Location: [facility]  Location Address: [address] [place] [address], [facility] [address], [address] [place] [address]  Location Phone: [phone]    Chief Complaint   Patient presents with   • Post-op       History of Present Illness  Yola Mitchell is a 45 y.o. female.  Yola Mitchell is status post Septoplasty, Bilateral inferior turbinate reduction via Coblation and Bilateral maxillary antrostomy on 21. Patient has complaints of nasal congestion, nasal drainage, sore throat, postnasal drip, sinus pressure, dental pain, and headache. She states she has had three episodes of what feels like an allergy attack of eyes watering, sneezing, cough and throat itching. She tried Benadryl but this dried her out. She states she went to the [employer] store and got sinus medication that helped and did not cause dryness. She states she has blew her nose softly and done the sinus saline rinse due to the attack.     I have personally reviewed the information imported into the chart during this visit.      I have personally reviewed the review of systems.          Past Medical History:   Diagnosis Date   • Back injury    • COVID-19 vaccine series completed     Moderna   • Depression    • Elevated cholesterol    • GERD (gastroesophageal reflux disease)    • Hyperlipidemia    • Hypertension    • IBS (irritable bowel syndrome)    • Migraines    • Multiple environmental allergies    • Sinusitis        Past Surgical History:   Procedure Laterality Date   • COLONOSCOPY     • CYST REMOVAL     • ENDOSCOPY N/A 10/29/2019    Procedure: ESOPHAGOGASTRODUODENOSCOPY WITH ANESTHESIA;  Surgeon: Berry Turner MD;  Location: [facility] ENDOSCOPY;  Service: Gastroenterology   • LEEP     • SEPTOPLASTY, RESECTION INFERIOR TURBINATES N/A 2021    Procedure: Septoplasty with bilateral inferior turbinate reduction via Coblation and bilateral maxillary antrostomy;  Surgeon: Foster Watters MD;  Location: [facility] OR;  Service:  ENT;  Laterality: N/A;   • WISDOM TOOTH EXTRACTION         Outpatient Medications Marked as Taking for the 8/2/21 encounter (Office Visit) with Foster Watters MD   Medication Sig Dispense Refill   • ALPRAZolam (XANAX) 0.5 MG tablet Take 0.5 mg by mouth Daily As Needed.     • baclofen (LIORESAL) 10 MG tablet Take 10 mg by mouth 2 (two) times a day.     • budesonide (RINOCORT AQUA) 32 MCG/ACT nasal spray 1 spray into the nostril(s) as directed by provider As Needed.     • buPROPion XL (WELLBUTRIN XL) 150 MG 24 hr tablet Take 150 mg by mouth Every Morning.     • EPINEPHrine (EPIPEN) 0.3 MG/0.3ML solution auto-injector injection epinephrine 0.3 mg/0.3 mL injection, auto-injector     • FLUoxetine (PROzac) 40 MG capsule Take 80 mg by mouth Daily.     • gabapentin (NEURONTIN) 300 MG capsule Take 300 mg by mouth 2 (Two) Times a Day.     • loratadine-pseudoephedrine (CLARITIN-D 24 HOUR)  MG per 24 hr tablet Take 1 tablet by mouth Daily. (Patient taking differently: Take 1 tablet by mouth As Needed for Allergies.) 7 tablet 0   • meloxicam (MOBIC) 15 MG tablet Take 15 mg by mouth Daily.     • omeprazole (priLOSEC) 40 MG capsule Take 40 mg by mouth Daily.     • rizatriptan (MAXALT) 10 MG tablet Take 10 mg by mouth Daily As Needed.     • simvastatin (ZOCOR) 40 MG tablet Take 40 mg by mouth Every Night.     • vitamin D (ERGOCALCIFEROL) 1.25 MG (85250 UT) capsule capsule TAKE 1 CAPSULE BY MOUTH 2 TIMES EVERY WEEK AS DIRECTED         Patient has no known allergies.    Family History   Problem Relation Age of Onset   • Cancer Mother    • Diabetes Father    • Breast cancer Neg Hx    • Colon cancer Neg Hx    • Colon polyps Neg Hx        Social History     Socioeconomic History   • Marital status:      Spouse name: Not on file   • Number of children: Not on file   • Years of education: Not on file   • Highest education level: Not on file   Tobacco Use   • Smoking status: Never Smoker   • Smokeless tobacco: Never  Used   Vaping Use   • Vaping Use: Never used   Substance and Sexual Activity   • Alcohol use: Yes     Comment: occ.    • Drug use: No   • Sexual activity: Defer       Review of Systems   HENT: Positive for congestion, postnasal drip, rhinorrhea, sinus pressure, sinus pain, sneezing and sore throat.    Eyes: Negative.    Respiratory: Positive for cough.    Cardiovascular: Negative.    Gastrointestinal: Negative.    Endocrine: Negative.    Genitourinary: Negative.    Musculoskeletal: Negative.    Skin: Negative.    Allergic/Immunologic: Negative.    Neurological: Positive for headaches.   Hematological: Negative.    Psychiatric/Behavioral: Negative.        Vitals:    08/02/21 1337   BP: 141/92   Pulse: 106   Temp: 97.3 °F (36.3 °C)       Body mass index is 40.03 kg/m².    Objective     Physical Exam  CONSTITUTIONAL: well nourished, well-developed, alert, oriented, in no acute distress     COMMUNICATION AND VOICE: able to communicate normally, normal voice quality    HEAD: normocephalic, no lesions, atraumatic, no tenderness, no masses     FACE: appearance normal, no lesions, no tenderness, no deformities, facial motion symmetric    EYES: ocular motility normal, eyelids normal, orbits normal, no proptosis, conjunctiva normal , pupils equal, round     EARS:  Hearing: hearing to conversational voice intact bilaterally   External Ears: normal bilaterally, no lesions    NOSE:  External Nose: external nasal structure normal, no tenderness on palpation, no nasal discharge, no lesions, no evidence of trauma, nostrils patent   Nasal exam: See endoscopy    ORAL:  Lips: upper and lower lips without lesion     NECK:  Inspection and Palpation: neck appearance normal, no masses or tenderness    CHEST/RESPIRATORY: normal respiratory effort     CARDIOVASCULAR: no cyanosis or edema     NEUROLOGICAL/PSYCHIATRIC: oriented to time, place and person, mood normal, affect appropriate, CN II-XII intact grossly    Assessment/Plan   Diagnoses  and all orders for this visit:    1. Chronic maxillary sinusitis (Primary)    2. Hypertrophy of both inferior nasal turbinates    3. Non-seasonal allergic rhinitis due to pollen    4. Recurrent rhinosinusitis      * Surgery not found *  No orders of the defined types were placed in this encounter.    Return in about 2 weeks (around 8/16/2021) for Recheck.       Patient Instructions   Call return for problems  Continue Ocean Spray as needed  Continue Bactroban as prescribed for 2 weeks  Resume intranasal steroid spray and intranasal antihistamine spray  Follow-up in 2 weeks

## 2021-08-11 NOTE — PROGRESS NOTES
YOB: 1975  Location: [facility]  Location Address: [address] [place] [address], [facility], [address] [place] [address]  Location Phone: [phone]    Chief Complaint   Patient presents with   • Follow-up       History of Present Illness  Yola Mitchell is a 45 y.o. female.  Yola Mitchell is here for follow up of ENT complaints. The patient is status Septoplasty, Bilateral inferior turbinate reduction via Coblation and Bilateral maxillary antrostomy on 21. Patient is doing well since last visit. She states that she has less nasal congestion, drainage, sneezing and watery eyes. She still feels like she may have some mucous retention.      Past Medical History:   Diagnosis Date   • Back injury    • COVID-19 vaccine series completed     Moderna   • Depression    • Elevated cholesterol    • GERD (gastroesophageal reflux disease)    • Hyperlipidemia    • Hypertension    • IBS (irritable bowel syndrome)    • Migraines    • Multiple environmental allergies    • Sinusitis        Past Surgical History:   Procedure Laterality Date   • COLONOSCOPY     • CYST REMOVAL     • ENDOSCOPY N/A 10/29/2019    Procedure: ESOPHAGOGASTRODUODENOSCOPY WITH ANESTHESIA;  Surgeon: Berry Turner MD;  Location: [facility];  Service: Gastroenterology   • LEEP     • SEPTOPLASTY, RESECTION INFERIOR TURBINATES N/A 2021    Procedure: Septoplasty with bilateral inferior turbinate reduction via Coblation and bilateral maxillary antrostomy;  Surgeon: Foster Watters MD;  Location: [facility] OR;  Service: ENT;  Laterality: N/A;   • WISDOM TOOTH EXTRACTION         Outpatient Medications Marked as Taking for the 21 encounter (Office Visit) with Foster Watters MD   Medication Sig Dispense Refill   • ALPRAZolam (XANAX) 0.5 MG tablet Take 0.5 mg by mouth Daily As Needed.     • baclofen (LIORESAL) 10 MG tablet Take 10 mg by mouth 2 (two) times a day.     • budesonide (RINOCORT AQUA) 32 MCG/ACT nasal spray 1 spray into the  nostril(s) as directed by provider As Needed.     • buPROPion XL (WELLBUTRIN XL) 150 MG 24 hr tablet Take 150 mg by mouth Every Morning.     • cefdinir (OMNICEF) 300 MG capsule Take 300 mg by mouth 2 (Two) Times a Day.     • EPINEPHrine (EPIPEN) 0.3 MG/0.3ML solution auto-injector injection epinephrine 0.3 mg/0.3 mL injection, auto-injector     • FLUoxetine (PROzac) 40 MG capsule Take 80 mg by mouth Daily.     • gabapentin (NEURONTIN) 300 MG capsule Take 300 mg by mouth 2 (Two) Times a Day.     • loratadine-pseudoephedrine (CLARITIN-D 24 HOUR)  MG per 24 hr tablet Take 1 tablet by mouth Daily. (Patient taking differently: Take 1 tablet by mouth As Needed for Allergies.) 7 tablet 0   • meloxicam (MOBIC) 15 MG tablet Take 15 mg by mouth Daily.     • omeprazole (priLOSEC) 40 MG capsule Take 40 mg by mouth Daily.     • rizatriptan (MAXALT) 10 MG tablet Take 10 mg by mouth Daily As Needed.     • simvastatin (ZOCOR) 40 MG tablet Take 40 mg by mouth Every Night.     • vitamin D (ERGOCALCIFEROL) 1.25 MG (92379 UT) capsule capsule TAKE 1 CAPSULE BY MOUTH 2 TIMES EVERY WEEK AS DIRECTED         Patient has no known allergies.    Family History   Problem Relation Age of Onset   • Cancer Mother    • Diabetes Father    • Breast cancer Neg Hx    • Colon cancer Neg Hx    • Colon polyps Neg Hx        Social History     Socioeconomic History   • Marital status:      Spouse name: Not on file   • Number of children: Not on file   • Years of education: Not on file   • Highest education level: Not on file   Tobacco Use   • Smoking status: Never Smoker   • Smokeless tobacco: Never Used   Vaping Use   • Vaping Use: Never used   Substance and Sexual Activity   • Alcohol use: Yes     Comment: occ.    • Drug use: No   • Sexual activity: Defer       Review of Systems   Constitutional: Negative.    HENT: Positive for congestion.    Eyes: Negative.    Respiratory: Negative.    Cardiovascular: Negative.    Gastrointestinal:  Negative.    Endocrine: Negative.    Genitourinary: Negative.    Musculoskeletal: Negative.    Skin: Negative.    Allergic/Immunologic: Negative.    Neurological: Negative.    Hematological: Negative.    Psychiatric/Behavioral: Negative.        Vitals:    08/12/21 1422   BP: 143/82   Pulse: 112   Temp: 97.8 °F (36.6 °C)       Body mass index is 40.66 kg/m².    Objective     Physical Exam  CONSTITUTIONAL: Obese, well nourished, well-developed, alert, oriented, in no acute distress     COMMUNICATION AND VOICE: able to communicate normally, normal voice quality    HEAD: normocephalic, no lesions, atraumatic, no tenderness, no masses     FACE: appearance normal, no lesions, no tenderness, no deformities, facial motion symmetric    EYES: ocular motility normal, eyelids normal, orbits normal, no proptosis, conjunctiva normal , pupils equal, round     EARS:  Hearing: hearing to conversational voice intact bilaterally   External Ears: normal bilaterally, no lesions  TMs clear bilaterally    NOSE:  External Nose: external nasal structure normal, no tenderness on palpation, no nasal discharge, no lesions, no evidence of trauma, nostrils patent   Intranasal exam: See endoscopy    ORAL:  Lips: upper and lower lips without lesion     NECK:  Inspection and Palpation: neck appearance normal, no masses or tenderness    CHEST/RESPIRATORY: normal respiratory effort     CARDIOVASCULAR: no cyanosis or edema     NEUROLOGICAL/PSYCHIATRIC: oriented to time, place and person, mood normal, affect appropriate, CN II-XII intact grossly    Assessment/Plan   Diagnoses and all orders for this visit:    1. Nasal septal deviation (Primary)    2. Hypertrophy of both inferior nasal turbinates    3. Non-seasonal allergic rhinitis due to pollen    4. Chronic maxillary sinusitis      * Surgery not found *  No orders of the defined types were placed in this encounter.    Return in about 2 months (around 10/12/2021).       Patient Instructions   Call  return for problems  Continue Ocean Spray as needed  Continue Bactroban as prescribed for 2 weeks  Resume intranasal steroid spray   Follow-up in 2 months

## 2021-08-12 ENCOUNTER — OFFICE VISIT (OUTPATIENT)
Dept: OTOLARYNGOLOGY | Facility: CLINIC | Age: 46
End: 2021-08-12

## 2021-08-12 VITALS
TEMPERATURE: 97.8 F | DIASTOLIC BLOOD PRESSURE: 82 MMHG | HEART RATE: 112 BPM | HEIGHT: 67 IN | SYSTOLIC BLOOD PRESSURE: 143 MMHG | WEIGHT: 259.6 LBS | BODY MASS INDEX: 40.74 KG/M2

## 2021-08-12 DIAGNOSIS — J34.2 NASAL SEPTAL DEVIATION: Primary | ICD-10-CM

## 2021-08-12 DIAGNOSIS — J30.1 NON-SEASONAL ALLERGIC RHINITIS DUE TO POLLEN: ICD-10-CM

## 2021-08-12 DIAGNOSIS — J32.0 CHRONIC MAXILLARY SINUSITIS: ICD-10-CM

## 2021-08-12 DIAGNOSIS — J34.3 HYPERTROPHY OF BOTH INFERIOR NASAL TURBINATES: ICD-10-CM

## 2021-08-12 PROCEDURE — 31237 NSL/SINS NDSC SURG BX POLYPC: CPT | Performed by: OTOLARYNGOLOGY

## 2021-08-12 PROCEDURE — 99024 POSTOP FOLLOW-UP VISIT: CPT | Performed by: OTOLARYNGOLOGY

## 2021-08-12 RX ORDER — CEFDINIR 300 MG/1
300 CAPSULE ORAL 2 TIMES DAILY
COMMUNITY
End: 2021-11-30

## 2021-08-12 RX ORDER — FLUTICASONE PROPIONATE 50 MCG
1 SPRAY, SUSPENSION (ML) NASAL DAILY
Qty: 18.2 ML | Refills: 6 | Status: SHIPPED | OUTPATIENT
Start: 2021-08-12 | End: 2021-09-11

## 2021-08-12 NOTE — PROGRESS NOTES
Procedure Note    Yola Mitchell    DATE OF PROCEDURE: 8/12/2021    PROCEDURE:   Bilateral Rigid Nasal Endoscopy with debridement     PREPROCEDURE DIAGNOSIS:   Chronic rhinosinusitis S/P   Septoplasty  Bilateral inferior turbinate reduction via Coblation  Bilateral maxillary antrostomy     POSTPROCEDURE DIAGNOSIS:  SAME     ANESTHESIA:   None       Procedure Details:    After topical anesthesia and decongestion, the patient was placed in the sitting position.  An endoscope was passed along the left nasal floor to the nasopharynx.  It was then passed into the region of the middle meatus, middle turbinate, and the sphenoethmoid region. An identical procedure was performed on the right side.  The following findings were noted as stated below:    Findings: Bilateral crusting, left greater than right with septum midline and no evidence of mucopurulent discharge.  Suction debridement was utilized to remove the residual nasal crusting along the medial edge of the inferior turbinate.  The rest of the nasal cavity examination was within normal limits with no recidivistic polyposis and both antrostomies were patent.    Condition:  Stable.  Patient tolerated procedure well.    Complications:  None

## 2021-08-12 NOTE — PATIENT INSTRUCTIONS
Call return for problems  Continue Ocean Spray as needed  Continue Bactroban as prescribed for 2 weeks  Resume intranasal steroid spray   Follow-up in 2 months

## 2021-10-12 DIAGNOSIS — Z01.818 PREOPERATIVE TESTING: Primary | ICD-10-CM

## 2021-10-15 ENCOUNTER — LAB (OUTPATIENT)
Dept: LAB | Facility: HOSPITAL | Age: 46
End: 2021-10-15

## 2021-10-15 DIAGNOSIS — Z01.818 PREOPERATIVE TESTING: ICD-10-CM

## 2021-10-15 LAB — SARS-COV-2 ORF1AB RESP QL NAA+PROBE: NOT DETECTED

## 2021-10-15 PROCEDURE — C9803 HOPD COVID-19 SPEC COLLECT: HCPCS

## 2021-10-15 PROCEDURE — U0004 COV-19 TEST NON-CDC HGH THRU: HCPCS

## 2021-11-30 ENCOUNTER — OFFICE VISIT (OUTPATIENT)
Dept: GASTROENTEROLOGY | Facility: CLINIC | Age: 46
End: 2021-11-30

## 2021-11-30 VITALS
HEART RATE: 96 BPM | TEMPERATURE: 97.1 F | HEIGHT: 67 IN | DIASTOLIC BLOOD PRESSURE: 72 MMHG | BODY MASS INDEX: 40.81 KG/M2 | WEIGHT: 260 LBS | SYSTOLIC BLOOD PRESSURE: 122 MMHG | OXYGEN SATURATION: 98 %

## 2021-11-30 DIAGNOSIS — Z78.9 NONSMOKER: ICD-10-CM

## 2021-11-30 DIAGNOSIS — E66.9 OBESITY, UNSPECIFIED OBESITY SEVERITY, UNSPECIFIED OBESITY TYPE: ICD-10-CM

## 2021-11-30 DIAGNOSIS — K59.04 CHRONIC IDIOPATHIC CONSTIPATION: ICD-10-CM

## 2021-11-30 DIAGNOSIS — K62.5 BRBPR (BRIGHT RED BLOOD PER RECTUM): Primary | ICD-10-CM

## 2021-11-30 PROCEDURE — 99214 OFFICE O/P EST MOD 30 MIN: CPT | Performed by: CLINICAL NURSE SPECIALIST

## 2021-11-30 RX ORDER — SODIUM, POTASSIUM,MAG SULFATES 17.5-3.13G
SOLUTION, RECONSTITUTED, ORAL ORAL
Qty: 177 ML | Refills: 0 | Status: ON HOLD | OUTPATIENT
Start: 2021-11-30 | End: 2021-12-10

## 2021-11-30 NOTE — PROGRESS NOTES
Yola Mitchell  1975 11/30/2021  Chief Complaint   Patient presents with   • GI Problem     Rectal bleeding     Subjective   HPI  Yola Mitchell is a 46 y.o. female who presents with a complaint of BRBPR that occurred a few weeks ago when she wiped on the tissue. She had the stomach bug or flu lasting a few days with N/V/D She had chills states a low grade fever. She says it was severe. She is better at this time with some bleeding depending on how much she goes to the bathroom. No wt loss. No upper GI complaints. She does have some constipation on a normal basis. She has taken digestive enzymes to help with this. She takes magnesium at night as well.     Past Medical History:   Diagnosis Date   • Back injury    • COVID-19 vaccine series completed     Moderna   • Depression    • Elevated cholesterol    • GERD (gastroesophageal reflux disease)    • Hyperlipidemia    • IBS (irritable bowel syndrome)    • Migraines    • Multiple environmental allergies    • Sinusitis      Past Surgical History:   Procedure Laterality Date   • COLONOSCOPY     • CYST REMOVAL     • ENDOSCOPY N/A 10/29/2019    Gastritis   • LEEP     • SEPTOPLASTY, RESECTION INFERIOR TURBINATES N/A 7/14/2021    Procedure: Septoplasty with bilateral inferior turbinate reduction via Coblation and bilateral maxillary antrostomy;  Surgeon: Foster Watters MD;  Location: [facility];  Service: ENT;  Laterality: N/A;   • WISDOM TOOTH EXTRACTION         Outpatient Medications Marked as Taking for the 11/30/21 encounter (Office Visit) with Cici Wall APRN   Medication Sig Dispense Refill   • ALPRAZolam (XANAX) 0.5 MG tablet Take 0.5 mg by mouth Daily As Needed.     • baclofen (LIORESAL) 10 MG tablet Take 10 mg by mouth 2 (two) times a day.     • budesonide (RINOCORT AQUA) 32 MCG/ACT nasal spray 1 spray into the nostril(s) as directed by provider As Needed.     • buPROPion XL (WELLBUTRIN XL) 150 MG 24 hr tablet Take 150 mg by mouth Every  Morning.     • EPINEPHrine (EPIPEN) 0.3 MG/0.3ML solution auto-injector injection epinephrine 0.3 mg/0.3 mL injection, auto-injector     • FLUoxetine (PROzac) 40 MG capsule Take 80 mg by mouth Daily.     • gabapentin (NEURONTIN) 300 MG capsule Take 300 mg by mouth 2 (Two) Times a Day.     • loratadine-pseudoephedrine (CLARITIN-D 24 HOUR)  MG per 24 hr tablet Take 1 tablet by mouth Daily. (Patient taking differently: Take 1 tablet by mouth As Needed for Allergies.) 7 tablet 0   • meloxicam (MOBIC) 15 MG tablet Take 15 mg by mouth Daily.     • omeprazole (priLOSEC) 40 MG capsule Take 40 mg by mouth Daily.     • rizatriptan (MAXALT) 10 MG tablet Take 10 mg by mouth Daily As Needed.     • simvastatin (ZOCOR) 40 MG tablet Take 40 mg by mouth Every Night.     • vitamin D (ERGOCALCIFEROL) 1.25 MG (68867 UT) capsule capsule TAKE 1 CAPSULE BY MOUTH 2 TIMES EVERY WEEK AS DIRECTED       No Known Allergies  Social History     Socioeconomic History   • Marital status:    Tobacco Use   • Smoking status: Never Smoker   • Smokeless tobacco: Never Used   Vaping Use   • Vaping Use: Never used   Substance and Sexual Activity   • Alcohol use: Yes     Comment: occ.    • Drug use: No   • Sexual activity: Defer     Family History   Problem Relation Age of Onset   • Cancer Mother    • Diabetes Father    • Breast cancer Neg Hx    • Colon cancer Neg Hx    • Colon polyps Neg Hx      Health Maintenance   Topic Date Due   • COLORECTAL CANCER SCREENING  Never done   • ANNUAL PHYSICAL  Never done   • LIPID PANEL  Never done   • HEPATITIS C SCREENING  Never done   • PAP SMEAR  Never done   • TDAP/TD VACCINES (1 - Tdap) 09/25/2020   • INFLUENZA VACCINE  08/01/2021   • COVID-19 Vaccine  Completed   • Pneumococcal Vaccine 0-64  Aged Out     Review of Systems   Constitutional: Negative for activity change, appetite change, chills, diaphoresis, fatigue, fever and unexpected weight change.   HENT: Negative for ear pain, hearing loss, mouth  "sores, sore throat, trouble swallowing and voice change.    Eyes: Negative.    Respiratory: Negative for cough, choking, shortness of breath and wheezing.    Cardiovascular: Negative for chest pain and palpitations.   Gastrointestinal: Positive for blood in stool and constipation. Negative for abdominal pain, diarrhea, nausea and vomiting.   Endocrine: Negative for cold intolerance and heat intolerance.   Genitourinary: Negative for decreased urine volume, dysuria, frequency, hematuria and urgency.   Musculoskeletal: Negative for back pain, gait problem and myalgias.   Skin: Negative for color change, pallor and rash.   Allergic/Immunologic: Negative for food allergies and immunocompromised state.   Neurological: Negative for dizziness, tremors, seizures, syncope, weakness, light-headedness, numbness and headaches.   Hematological: Negative for adenopathy. Does not bruise/bleed easily.   Psychiatric/Behavioral: Negative for agitation and confusion. The patient is not nervous/anxious.    All other systems reviewed and are negative.    Objective   Vitals:    11/30/21 0928   BP: 122/72   Pulse: 96   Temp: 97.1 °F (36.2 °C)   SpO2: 98%   Weight: 118 kg (260 lb)   Height: 170.2 cm (67\")     Body mass index is 40.72 kg/m².  Physical Exam  Constitutional:       Appearance: She is well-developed.   HENT:      Head: Normocephalic and atraumatic.   Eyes:      Pupils: Pupils are equal, round, and reactive to light.   Neck:      Trachea: No tracheal deviation.   Cardiovascular:      Rate and Rhythm: Normal rate and regular rhythm.      Heart sounds: Normal heart sounds. No murmur heard.  No friction rub. No gallop.    Pulmonary:      Effort: Pulmonary effort is normal. No respiratory distress.      Breath sounds: Normal breath sounds. No wheezing or rales.   Chest:      Chest wall: No tenderness.   Abdominal:      General: Bowel sounds are normal. There is no distension.      Palpations: Abdomen is soft. Abdomen is not rigid. "      Tenderness: There is no abdominal tenderness. There is no guarding or rebound.   Musculoskeletal:         General: No tenderness or deformity. Normal range of motion.      Cervical back: Normal range of motion and neck supple.   Skin:     General: Skin is warm and dry.      Coloration: Skin is not pale.      Findings: No rash.   Neurological:      Mental Status: She is alert and oriented to person, place, and time.      Deep Tendon Reflexes: Reflexes are normal and symmetric.   Psychiatric:         Behavior: Behavior normal.         Thought Content: Thought content normal.         Judgment: Judgment normal.       Assessment/Plan   Diagnoses and all orders for this visit:    1. BRBPR (bright red blood per rectum) (Primary)  -     Case Request; Standing  -     Follow Anesthesia Guidelines / Standing Orders; Future  -     Obtain Informed Consent; Future  -     Case Request  -     Suprep Bowel Prep Kit 17.5-3.13-1.6 GM/177ML solution oral solution; Take as directed by office instructions provided  Dispense: 177 mL; Refill: 0    2. Chronic idiopathic constipation    3. Nonsmoker    4. Obesity, unspecified obesity severity, unspecified obesity type    fiber and Miralax suggested for constipation increase water intake  Medications can be a contributing factor  I feel that she had gastroentritis which seems to have resolved at this time and she is back to constipation.     COLONOSCOPY WITH ANESTHESIA (N/A)  Part of this note may be an electronic transcription/translation of spoken language to printed text using the Dragon Dictation System.  Body mass index is 40.72 kg/m².  No follow-ups on file.    Patient's Body mass index is 40.72 kg/m². indicating that she is morbidly obese (BMI > 40 or > 35 with obesity - related health condition). Obesity-related health conditions include the following: hypertension. Obesity is unchanged. BMI is is above average; BMI management plan is completed. We discussed portion control and  increasing exercise..      All risks, benefits, alternatives, and indications of colonoscopy and/or Endoscopy procedure have been discussed with the patient. Risks to include perforation of the colon requiring possible surgery or colostomy, risk of bleeding from biopsies or removal of colon tissue, possibility of missing a colon polyp or cancer, or adverse drug reaction.  Benefits to include the diagnosis and management of disease of the colon and rectum. Alternatives to include barium enema, radiographic evaluation, lab testing or no intervention. Pt verbalizes understanding and agrees.     Cici Wall, APRN  11/30/2021  09:56 CST          If you smoke or use tobacco, 4 minutes reading provided  Steps to Quit Smoking  Smoking tobacco can be harmful to your health and can affect almost every organ in your body. Smoking puts you, and those around you, at risk for developing many serious chronic diseases. Quitting smoking is difficult, but it is one of the best things that you can do for your health. It is never too late to quit.  What are the benefits of quitting smoking?  When you quit smoking, you lower your risk of developing serious diseases and conditions, such as:  · Lung cancer or lung disease, such as COPD.  · Heart disease.  · Stroke.  · Heart attack.  · Infertility.  · Osteoporosis and bone fractures.  Additionally, symptoms such as coughing, wheezing, and shortness of breath may get better when you quit. You may also find that you get sick less often because your body is stronger at fighting off colds and infections. If you are pregnant, quitting smoking can help to reduce your chances of having a baby of low birth weight.  How do I get ready to quit?  When you decide to quit smoking, create a plan to make sure that you are successful. Before you quit:  · Pick a date to quit. Set a date within the next two weeks to give you time to prepare.  · Write down the reasons why you are quitting. Keep this  list in places where you will see it often, such as on your bathroom mirror or in your car or wallet.  · Identify the people, places, things, and activities that make you want to smoke (triggers) and avoid them. Make sure to take these actions:  ¨ Throw away all cigarettes at home, at work, and in your car.  ¨ Throw away smoking accessories, such as ashtrays and lighters.  ¨ Clean your car and make sure to empty the ashtray.  ¨ Clean your home, including curtains and carpets.  · Tell your family, friends, and coworkers that you are quitting. Support from your loved ones can make quitting easier.  · Talk with your health care provider about your options for quitting smoking.  · Find out what treatment options are covered by your health insurance.  What strategies can I use to quit smoking?  Talk with your healthcare provider about different strategies to quit smoking. Some strategies include:  · Quitting smoking altogether instead of gradually lessening how much you smoke over a period of time. Research shows that quitting “cold turkey” is more successful than gradually quitting.  · Attending in-person counseling to help you build problem-solving skills. You are more likely to have success in quitting if you attend several counseling sessions. Even short sessions of 10 minutes can be effective.  · Finding resources and support systems that can help you to quit smoking and remain smoke-free after you quit. These resources are most helpful when you use them often. They can include:  ¨ Online chats with a counselor.  ¨ Telephone quitlines.  ¨ Printed self-help materials.  ¨ Support groups or group counseling.  ¨ Text messaging programs.  ¨ Mobile phone applications.  · Taking medicines to help you quit smoking. (If you are pregnant or breastfeeding, talk with your health care provider first.) Some medicines contain nicotine and some do not. Both types of medicines help with cravings, but the medicines that include  nicotine help to relieve withdrawal symptoms. Your health care provider may recommend:  ¨ Nicotine patches, gum, or lozenges.  ¨ Nicotine inhalers or sprays.  ¨ Non-nicotine medicine that is taken by mouth.  Talk with your health care provider about combining strategies, such as taking medicines while you are also receiving in-person counseling. Using these two strategies together makes you more likely to succeed in quitting than if you used either strategy on its own.  If you are pregnant or breastfeeding, talk with your health care provider about finding counseling or other support strategies to quit smoking. Do not take medicine to help you quit smoking unless told to do so by your health care provider.  What things can I do to make it easier to quit?  Quitting smoking might feel overwhelming at first, but there is a lot that you can do to make it easier. Take these important actions:  · Reach out to your family and friends and ask that they support and encourage you during this time. Call telephone quitlines, reach out to support groups, or work with a counselor for support.  · Ask people who smoke to avoid smoking around you.  · Avoid places that trigger you to smoke, such as bars, parties, or smoke-break areas at work.  · Spend time around people who do not smoke.  · Lessen stress in your life, because stress can be a smoking trigger for some people. To lessen stress, try:  ¨ Exercising regularly.  ¨ Deep-breathing exercises.  ¨ Yoga.  ¨ Meditating.  ¨ Performing a body scan. This involves closing your eyes, scanning your body from head to toe, and noticing which parts of your body are particularly tense. Purposefully relax the muscles in those areas.  · Download or purchase mobile phone or tablet apps (applications) that can help you stick to your quit plan by providing reminders, tips, and encouragement. There are many free apps, such as QuitGuide from the CDC (Centers for Disease Control and Prevention).  You can find other support for quitting smoking (smoking cessation) through smokefree.gov and other websites.  How will I feel when I quit smoking?  Within the first 24 hours of quitting smoking, you may start to feel some withdrawal symptoms. These symptoms are usually most noticeable 2-3 days after quitting, but they usually do not last beyond 2-3 weeks. Changes or symptoms that you might experience include:  · Mood swings.  · Restlessness, anxiety, or irritation.  · Difficulty concentrating.  · Dizziness.  · Strong cravings for sugary foods in addition to nicotine.  · Mild weight gain.  · Constipation.  · Nausea.  · Coughing or a sore throat.  · Changes in how your medicines work in your body.  · A depressed mood.  · Difficulty sleeping (insomnia).  After the first 2-3 weeks of quitting, you may start to notice more positive results, such as:  · Improved sense of smell and taste.  · Decreased coughing and sore throat.  · Slower heart rate.  · Lower blood pressure.  · Clearer skin.  · The ability to breathe more easily.  · Fewer sick days.  Quitting smoking is very challenging for most people. Do not get discouraged if you are not successful the first time. Some people need to make many attempts to quit before they achieve long-term success. Do your best to stick to your quit plan, and talk with your health care provider if you have any questions or concerns.  This information is not intended to replace advice given to you by your health care provider. Make sure you discuss any questions you have with your health care provider.  Document Released: 12/12/2002 Document Revised: 08/15/2017 Document Reviewed: 05/03/2016  [employer]vier Interactive Patient Education © 2017 [employer]

## 2021-12-10 ENCOUNTER — HOSPITAL ENCOUNTER (OUTPATIENT)
Facility: HOSPITAL | Age: 46
Setting detail: HOSPITAL OUTPATIENT SURGERY
Discharge: HOME | End: 2021-12-10
Attending: INTERNAL MEDICINE | Admitting: INTERNAL MEDICINE

## 2021-12-10 ENCOUNTER — ANESTHESIA (OUTPATIENT)
Dept: GASTROENTEROLOGY | Facility: HOSPITAL | Age: 46
End: 2021-12-10

## 2021-12-10 ENCOUNTER — ANESTHESIA EVENT (OUTPATIENT)
Dept: GASTROENTEROLOGY | Facility: HOSPITAL | Age: 46
End: 2021-12-10

## 2021-12-10 VITALS
SYSTOLIC BLOOD PRESSURE: 119 MMHG | HEIGHT: 68 IN | TEMPERATURE: 97.1 F | RESPIRATION RATE: 16 BRPM | OXYGEN SATURATION: 100 % | DIASTOLIC BLOOD PRESSURE: 72 MMHG | BODY MASS INDEX: 38.04 KG/M2 | WEIGHT: 251 LBS | HEART RATE: 77 BPM

## 2021-12-10 DIAGNOSIS — K62.5 BRBPR (BRIGHT RED BLOOD PER RECTUM): ICD-10-CM

## 2021-12-10 LAB — B-HCG UR QL: NEGATIVE

## 2021-12-10 PROCEDURE — 25010000002 PROPOFOL 10 MG/ML EMULSION: Performed by: NURSE ANESTHETIST, CERTIFIED REGISTERED

## 2021-12-10 PROCEDURE — 45378 DIAGNOSTIC COLONOSCOPY: CPT | Performed by: INTERNAL MEDICINE

## 2021-12-10 PROCEDURE — 81025 URINE PREGNANCY TEST: CPT | Performed by: ANESTHESIOLOGY

## 2021-12-10 RX ORDER — SODIUM CHLORIDE 0.9 % (FLUSH) 0.9 %
10 SYRINGE (ML) INJECTION AS NEEDED
Status: DISCONTINUED | OUTPATIENT
Start: 2021-12-10 | End: 2021-12-10 | Stop reason: HOSPADM

## 2021-12-10 RX ORDER — LIDOCAINE HYDROCHLORIDE 10 MG/ML
0.5 INJECTION, SOLUTION EPIDURAL; INFILTRATION; INTRACAUDAL; PERINEURAL ONCE AS NEEDED
Status: DISCONTINUED | OUTPATIENT
Start: 2021-12-10 | End: 2021-12-10 | Stop reason: HOSPADM

## 2021-12-10 RX ORDER — TIZANIDINE 4 MG/1
4 TABLET ORAL NIGHTLY PRN
COMMUNITY

## 2021-12-10 RX ORDER — LIDOCAINE HYDROCHLORIDE 20 MG/ML
INJECTION, SOLUTION EPIDURAL; INFILTRATION; INTRACAUDAL; PERINEURAL AS NEEDED
Status: DISCONTINUED | OUTPATIENT
Start: 2021-12-10 | End: 2021-12-10 | Stop reason: SURG

## 2021-12-10 RX ORDER — PROPOFOL 10 MG/ML
VIAL (ML) INTRAVENOUS AS NEEDED
Status: DISCONTINUED | OUTPATIENT
Start: 2021-12-10 | End: 2021-12-10 | Stop reason: SURG

## 2021-12-10 RX ORDER — SODIUM CHLORIDE 9 MG/ML
500 INJECTION, SOLUTION INTRAVENOUS CONTINUOUS PRN
Status: DISCONTINUED | OUTPATIENT
Start: 2021-12-10 | End: 2021-12-10 | Stop reason: HOSPADM

## 2021-12-10 RX ADMIN — LIDOCAINE HYDROCHLORIDE 50 MG: 20 INJECTION, SOLUTION EPIDURAL; INFILTRATION; INTRACAUDAL; PERINEURAL at 10:33

## 2021-12-10 RX ADMIN — SODIUM CHLORIDE 500 ML: 9 INJECTION, SOLUTION INTRAVENOUS at 10:14

## 2021-12-10 RX ADMIN — PROPOFOL 200 MG: 10 INJECTION, EMULSION INTRAVENOUS at 10:44

## 2021-12-10 RX ADMIN — PROPOFOL 200 MG: 10 INJECTION, EMULSION INTRAVENOUS at 10:33

## 2021-12-10 NOTE — ANESTHESIA POSTPROCEDURE EVALUATION
"Patient: Yola Mitchell    Procedure Summary     Date: 12/10/21 Room / Location: [facility] ENDOSCOPY 2 / [facility] ENDOSCOPY    Anesthesia Start: 1031 Anesthesia Stop: 1054    Procedure: COLONOSCOPY WITH ANESTHESIA (N/A ) Diagnosis:       BRBPR (bright red blood per rectum)      (BRBPR (bright red blood per rectum) [K62.5])    Surgeons: Berry Turner MD Provider: Juanjose Villavicencio CRNA    Anesthesia Type: MAC ASA Status: 2          Anesthesia Type: MAC    Vitals  Vitals Value Taken Time   /62 12/10/21 1051   Temp     Pulse 88 12/10/21 1054   Resp     SpO2 97 % 12/10/21 1054   Vitals shown include unvalidated device data.        Post Anesthesia Care and Evaluation    Patient location during evaluation: PHASE II  Patient participation: complete - patient participated  Level of consciousness: awake and alert  Pain management: adequate  Airway patency: patent  Anesthetic complications: No anesthetic complications    Cardiovascular status: acceptable  Respiratory status: acceptable  Hydration status: acceptable    Comments: Blood pressure 103/97, pulse 92, temperature 97.1 °F (36.2 °C), temperature source Temporal, resp. rate 20, height 172.7 cm (68\"), weight 114 kg (251 lb), last menstrual period 12/07/2021, SpO2 98 %, not currently breastfeeding.    Pt discharged from PACU based on deena score >8      "

## 2021-12-14 ENCOUNTER — TELEPHONE (OUTPATIENT)
Dept: GASTROENTEROLOGY | Facility: CLINIC | Age: 46
End: 2021-12-14

## 2022-01-01 NOTE — PATIENT INSTRUCTIONS
Call return for problems  Continue Ocean Spray as needed  Continue Bactroban as prescribed for 2 weeks  Resume intranasal steroid spray and intranasal antihistamine spray  Follow-up in 2 weeks     hard copy, drawn during this pregnancy

## 2022-02-28 ENCOUNTER — TRANSCRIBE ORDERS (OUTPATIENT)
Dept: ADMINISTRATIVE | Facility: HOSPITAL | Age: 47
End: 2022-02-28

## 2022-02-28 DIAGNOSIS — Z12.31 ENCOUNTER FOR SCREENING MAMMOGRAM FOR MALIGNANT NEOPLASM OF BREAST: Primary | ICD-10-CM

## 2022-03-08 ENCOUNTER — HOSPITAL ENCOUNTER (OUTPATIENT)
Dept: MAMMOGRAPHY | Facility: HOSPITAL | Age: 47
Discharge: HOME | End: 2022-03-08
Admitting: PHYSICIAN ASSISTANT

## 2022-03-08 DIAGNOSIS — Z12.31 ENCOUNTER FOR SCREENING MAMMOGRAM FOR MALIGNANT NEOPLASM OF BREAST: ICD-10-CM

## 2022-03-08 PROCEDURE — 77067 SCR MAMMO BI INCL CAD: CPT

## 2022-03-08 PROCEDURE — 77063 BREAST TOMOSYNTHESIS BI: CPT

## 2023-03-18 ENCOUNTER — TELEPHONE (OUTPATIENT)
Dept: URGENT CARE | Facility: CLINIC | Age: 48
End: 2023-03-18
Payer: COMMERCIAL

## 2023-03-18 DIAGNOSIS — U07.1 COVID-19: Primary | ICD-10-CM

## 2023-03-18 PROCEDURE — 87636 SARSCOV2 & INF A&B AMP PRB: CPT | Performed by: FAMILY MEDICINE

## 2023-04-12 ENCOUNTER — TRANSCRIBE ORDERS (OUTPATIENT)
Dept: ADMINISTRATIVE | Facility: HOSPITAL | Age: 48
End: 2023-04-12
Payer: COMMERCIAL

## 2023-04-12 DIAGNOSIS — Z12.31 VISIT FOR SCREENING MAMMOGRAM: Primary | ICD-10-CM

## 2023-04-20 ENCOUNTER — HOSPITAL ENCOUNTER (OUTPATIENT)
Dept: MAMMOGRAPHY | Facility: HOSPITAL | Age: 48
Discharge: HOME | End: 2023-04-20
Admitting: NURSE PRACTITIONER
Payer: COMMERCIAL

## 2023-04-20 DIAGNOSIS — Z12.31 VISIT FOR SCREENING MAMMOGRAM: ICD-10-CM

## 2023-04-20 PROCEDURE — 77063 BREAST TOMOSYNTHESIS BI: CPT

## 2023-04-20 PROCEDURE — 77067 SCR MAMMO BI INCL CAD: CPT

## 2023-04-27 ENCOUNTER — HOSPITAL ENCOUNTER (OUTPATIENT)
Dept: ULTRASOUND IMAGING | Facility: HOSPITAL | Age: 48
Discharge: HOME | End: 2023-04-27
Payer: COMMERCIAL

## 2023-04-27 ENCOUNTER — HOSPITAL ENCOUNTER (OUTPATIENT)
Dept: MAMMOGRAPHY | Facility: HOSPITAL | Age: 48
Discharge: HOME | End: 2023-04-27
Payer: COMMERCIAL

## 2023-04-27 DIAGNOSIS — R92.8 ABNORMAL MAMMOGRAM: ICD-10-CM

## 2023-04-27 PROCEDURE — G0279 TOMOSYNTHESIS, MAMMO: HCPCS

## 2023-04-27 PROCEDURE — 77065 DX MAMMO INCL CAD UNI: CPT

## 2023-04-27 PROCEDURE — 76642 ULTRASOUND BREAST LIMITED: CPT

## 2023-05-03 ENCOUNTER — HOSPITAL ENCOUNTER (OUTPATIENT)
Dept: ULTRASOUND IMAGING | Facility: HOSPITAL | Age: 48
Discharge: HOME | End: 2023-05-03
Payer: COMMERCIAL

## 2023-05-03 ENCOUNTER — HOSPITAL ENCOUNTER (OUTPATIENT)
Dept: MAMMOGRAPHY | Facility: HOSPITAL | Age: 48
Discharge: HOME | End: 2023-05-03
Payer: COMMERCIAL

## 2023-05-03 DIAGNOSIS — R92.8 ABNORMAL MAMMOGRAM: ICD-10-CM

## 2023-05-03 PROCEDURE — 88305 TISSUE EXAM BY PATHOLOGIST: CPT | Performed by: NURSE PRACTITIONER

## 2023-05-03 PROCEDURE — A4648 IMPLANTABLE TISSUE MARKER: HCPCS

## 2023-05-03 RX ORDER — LIDOCAINE HYDROCHLORIDE 10 MG/ML
10 INJECTION, SOLUTION INFILTRATION; PERINEURAL ONCE
Status: DISPENSED | OUTPATIENT
Start: 2023-05-03

## 2023-05-03 RX ORDER — LIDOCAINE HYDROCHLORIDE AND EPINEPHRINE 10; 10 MG/ML; UG/ML
10 INJECTION, SOLUTION INFILTRATION; PERINEURAL ONCE
Status: DISPENSED | OUTPATIENT
Start: 2023-05-03

## 2023-05-04 LAB
CYTO UR: NORMAL
LAB AP CASE REPORT: NORMAL
Lab: NORMAL
PATH REPORT.FINAL DX SPEC: NORMAL
PATH REPORT.GROSS SPEC: NORMAL

## 2024-08-07 ENCOUNTER — OFFICE VISIT (OUTPATIENT)
Dept: OBSTETRICS AND GYNECOLOGY | Age: 49
End: 2024-08-07
Payer: COMMERCIAL

## 2024-08-07 VITALS
BODY MASS INDEX: 38.14 KG/M2 | SYSTOLIC BLOOD PRESSURE: 124 MMHG | DIASTOLIC BLOOD PRESSURE: 86 MMHG | WEIGHT: 243 LBS | HEIGHT: 67 IN

## 2024-08-07 DIAGNOSIS — Z76.89 ESTABLISHING CARE WITH NEW DOCTOR, ENCOUNTER FOR: ICD-10-CM

## 2024-08-07 DIAGNOSIS — Z12.31 ENCOUNTER FOR SCREENING MAMMOGRAM FOR MALIGNANT NEOPLASM OF BREAST: ICD-10-CM

## 2024-08-07 DIAGNOSIS — Z79.890 HORMONE REPLACEMENT THERAPY (HRT): ICD-10-CM

## 2024-08-07 DIAGNOSIS — Z12.4 PAP SMEAR FOR CERVICAL CANCER SCREENING: ICD-10-CM

## 2024-08-07 DIAGNOSIS — Z01.419 ENCOUNTER FOR WELL WOMAN EXAM WITH ROUTINE GYNECOLOGICAL EXAM: Primary | ICD-10-CM

## 2024-08-07 DIAGNOSIS — E66.9 OBESITY (BMI 30-39.9): ICD-10-CM

## 2024-08-07 PROCEDURE — 87624 HPV HI-RISK TYP POOLED RSLT: CPT | Performed by: NURSE PRACTITIONER

## 2024-08-07 PROCEDURE — 87625 HPV TYPES 16 & 18 ONLY: CPT | Performed by: NURSE PRACTITIONER

## 2024-08-07 RX ORDER — HYDROCORTISONE ACETATE 25 MG/1
SUPPOSITORY RECTAL
COMMUNITY

## 2024-08-07 RX ORDER — PROMETHAZINE HYDROCHLORIDE 25 MG/1
1 TABLET ORAL DAILY PRN
COMMUNITY

## 2024-08-07 RX ORDER — FAMOTIDINE 40 MG/1
1 TABLET, FILM COATED ORAL DAILY
COMMUNITY

## 2024-08-07 RX ORDER — RIMEGEPANT SULFATE 75 MG/75MG
TABLET, ORALLY DISINTEGRATING ORAL
COMMUNITY

## 2024-08-07 NOTE — PROGRESS NOTES
Chief Complaint   Patient presents with    Gynecologic Exam     Patient is new to our office and here to establish care.  Patient is due for annual well GYN Exam.  Last well GYN exam and pap 4/19/23, normal/-HPV with Dr. Oquendo. History of LEEP in the past Dr. Biggs. Last mammo 5/3/23 biopsy clip placed, BIRADS 0. Mirena IUD placed 8/1/2017. Pt c/o some vaginal dryness with dyspareunia. Seeing [facility].  Patient denies current pelvic pain, abnormal vaginal bleeding or discharge, urinary incontinence, and voices no other complaints.        History:  Yola Oakes is a 48 y.o. female who presents today for evaluation of the above problems.      Yola Oakes is a 48 y.o. female here today for annual GYN examination. Not currently having  periods. She currently has a Mirena IUD in place since 08/2017. Due for removal in 2025 . History of abnormal pap smears with LEEP. She denies any vaginal discharge or bleeding. She is on hormone replacement therapy through [facility].   Her last mammogram was in 05/2023 and had a biopsy with clip placement.     Patient is treating long covid symptoms through [facility]  Symptoms at the time were fatigue, low libido, sweating and hot flashes.   Using testosterone pellet, was placed two weeks ago  Omega 3  HRT complete (vitamin supplement)  Oral progesterone  Will have labs rechecked in three weeks.          ROS:  Review of Systems   Constitutional:  Positive for fatigue.   HENT: Negative.     Eyes: Negative.    Respiratory: Negative.     Cardiovascular: Negative.    Gastrointestinal: Negative.    Endocrine: Positive for heat intolerance.   Genitourinary:  Positive for decreased libido, dyspareunia and vaginal pain.   Musculoskeletal: Negative.    Skin: Negative.    Neurological: Negative.         Brain fog   Psychiatric/Behavioral: Negative.         No Known Allergies  Past Medical History:   Diagnosis Date    Abnormal Pap smear of cervix     Anxiety     Back injury      Clotting disorder 2014    COVID-19 vaccine series completed     Moderna    Depression     Elevated cholesterol     Fibroid     GERD (gastroesophageal reflux disease)     HPV (human papilloma virus) infection     Hyperlipidemia     IBS (irritable bowel syndrome)     Migraines     Multiple environmental allergies     Ovarian cyst     PMS (premenstrual syndrome)     Sinusitis     Urinary tract infection     Varicella      Past Surgical History:   Procedure Laterality Date    BREAST BIOPSY  4/2024    CERVICAL BIOPSY  W/ LOOP ELECTRODE EXCISION  11/2012    COLONOSCOPY      COLONOSCOPY N/A 12/10/2021    Procedure: COLONOSCOPY WITH ANESTHESIA;  Surgeon: Berry Turner MD;  Location: [facility] ENDOSCOPY;  Service: Gastroenterology;  Laterality: N/A;  pre brbpr  post  Dr. Akers    CYST REMOVAL      ENDOSCOPY N/A 10/29/2019    Gastritis    LEEP      SEPTOPLASTY, RESECTION INFERIOR TURBINATES N/A 07/14/2021    Procedure: Septoplasty with bilateral inferior turbinate reduction via Coblation and bilateral maxillary antrostomy;  Surgeon: Foster Watters MD;  Location: [facility] OR;  Service: ENT;  Laterality: N/A;    WISDOM TOOTH EXTRACTION       Family History   Problem Relation Age of Onset    Diabetes Father     Coronary artery disease Father     Cancer Mother     Melanoma Sister     Diabetes Sister     Breast cancer Neg Hx     Colon cancer Neg Hx     Colon polyps Neg Hx     Ovarian cancer Neg Hx     Uterine cancer Neg Hx       reports that she has never smoked. She has never used smokeless tobacco. She reports current alcohol use. She reports that she does not use drugs.      Current Outpatient Medications:     ALPRAZolam (XANAX) 0.5 MG tablet, Take 1 tablet by mouth Daily As Needed., Disp: , Rfl:     budesonide (RINOCORT AQUA) 32 MCG/ACT nasal spray, 1 spray into the nostril(s) as directed by provider As Needed., Disp: , Rfl:     buPROPion XL (WELLBUTRIN XL) 150 MG 24 hr tablet, Take 1 tablet by mouth Every Morning., Disp:  , Rfl:     DHA-EPA-Vitamin E (OMEGA-3 COMPLEX PO), Take  by mouth., Disp: , Rfl:     diclofenac (VOLTAREN) 75 MG EC tablet, diclofenac sodium 75 mg tablet,delayed release  TAKE 1 TABLET BY MOUTH EVERY 12 HOURS AS NEEDED, Disp: , Rfl:     EPINEPHrine (EPIPEN) 0.3 MG/0.3ML solution auto-injector injection, epinephrine 0.3 mg/0.3 mL injection, auto-injector, Disp: , Rfl:     famotidine (PEPCID) 40 MG tablet, Take 1 tablet by mouth Daily., Disp: , Rfl:     FLUoxetine (PROzac) 40 MG capsule, Take 2 capsules by mouth Daily., Disp: , Rfl:     gabapentin (NEURONTIN) 300 MG capsule, Take 1 capsule by mouth 2 (Two) Times a Day., Disp: , Rfl:     hydrocortisone (Anucort-HC) 25 MG suppository, UNWRAP AND INSERT 1 SUPPOSITORY RECTALLY TWICE DAILY AS NEEDED, Disp: , Rfl:     loratadine-pseudoephedrine (CLARITIN-D 12-hour) 5-120 MG per 12 hr tablet, TAKE 1 TABLET BY MOUTH 1 TO 2 TIMES DAILY AS NEEDED, Disp: , Rfl:     NON FORMULARY, HRT Tablet - Eviaxs - Vit B6, B12, Folate, Disp: , Rfl:     Nurtec 75 MG dispersible tablet, , Disp: , Rfl:     omeprazole (priLOSEC) 40 MG capsule, Take 1 capsule by mouth Daily., Disp: , Rfl:     PROGESTERONE MICRONIZED TD, Place  on the skin as directed by provider. [facility] compound, Disp: , Rfl:     promethazine (PHENERGAN) 25 MG tablet, Take 1 tablet by mouth Daily As Needed., Disp: , Rfl:     rizatriptan (MAXALT) 10 MG tablet, Take 1 tablet by mouth Daily As Needed., Disp: , Rfl:     simvastatin (ZOCOR) 40 MG tablet, Take 1 tablet by mouth Every Night., Disp: , Rfl:     Testosterone 37.5 MG pellet, by Implant route., Disp: , Rfl:     tiZANidine (ZANAFLEX) 4 MG tablet, Take 1 tablet by mouth At Night As Needed for Muscle Spasms., Disp: , Rfl:     vitamin D (ERGOCALCIFEROL) 1.25 MG (93373 UT) capsule capsule, TAKE 1 CAPSULE BY MOUTH 2 TIMES EVERY WEEK AS DIRECTED, Disp: , Rfl:     fluticasone (Flonase) 50 MCG/ACT nasal spray, 1 spray into the nostril(s) as directed by provider Daily for 30 days.  "Administer 2 sprays in each nostril for each dose., Disp: 18.2 mL, Rfl: 6    Current Facility-Administered Medications:     lidocaine (XYLOCAINE) 1 % injection 10 mL, 10 mL, Subcutaneous, Once, Cici Francis MD    lidocaine 1% - EPINEPHrine 1:031077 (XYLOCAINE W/EPI) 1 %-1:928947 injection 10 mL, 10 mL, Injection, Once, Cici Francis MD    OBJECTIVE:  /86   Ht 170.2 cm (67\")   Wt 110 kg (243 lb)   BMI 38.06 kg/m²    Physical Exam  Exam conducted with a chaperone present.   Constitutional:       Appearance: She is well-developed.   HENT:      Head: Normocephalic and atraumatic.   Eyes:      General: Lids are normal.      Conjunctiva/sclera: Conjunctivae normal.      Pupils: Pupils are equal, round, and reactive to light.   Neck:      Thyroid: No thyromegaly.   Cardiovascular:      Rate and Rhythm: Normal rate and regular rhythm.      Heart sounds: Normal heart sounds.   Pulmonary:      Effort: Pulmonary effort is normal.      Breath sounds: Normal breath sounds.   Chest:   Breasts:     Breasts are symmetrical.      Right: No inverted nipple, mass, nipple discharge, skin change or tenderness.      Left: No inverted nipple, mass, nipple discharge, skin change or tenderness.   Abdominal:      General: Bowel sounds are normal.      Palpations: Abdomen is soft.   Genitourinary:     Exam position: Supine.      Labia:         Right: No rash, tenderness, lesion or injury.         Left: No rash, tenderness, lesion or injury.       Vagina: No signs of injury and foreign body. No vaginal discharge, erythema, tenderness or bleeding.      Cervix: No cervical motion tenderness, discharge or friability.      Uterus: Not deviated, not enlarged, not fixed and not tender.       Adnexa:         Right: No mass, tenderness or fullness.          Left: No mass, tenderness or fullness.        Rectum: Normal. No tenderness or external hemorrhoid.   Musculoskeletal:         General: Normal range of motion.      " Cervical back: Normal range of motion and neck supple.   Skin:     General: Skin is warm and dry.   Neurological:      Mental Status: She is alert and oriented to person, place, and time.         Assessment/Plan    Diagnoses and all orders for this visit:    1. Encounter for well woman exam with routine gynecological exam (Primary)    2. Encounter for screening mammogram for malignant neoplasm of breast  -     Mammo Screening Digital Tomosynthesis Bilateral With CAD; Future    3. Pap smear for cervical cancer screening  -     Liquid-based Pap Smear, Screening    4. Establishing care with new doctor, encounter for    5. Hormone replacement therapy (HRT)  Comments:  reviewed outside labs from [facility]. advised patient that at the time of the labs she had Mirena in place. caution advised in testosterone therapy with pellets.    6. Obesity (BMI 30-39.9)  Comments:  offered referral to bariatric weight loss clinic. declines at this time.    Immunizations:      - Tetanus: Unknown or >10 years ago. Recommend to have at pharmacy or on injury.      - Influenza: recommended annually      - Pneumovax:once after age 65      - Prevnar: Once after age 65      - Zostavax: Once after age 60  Colon Cancer Screening: Due 2029  Mammogram: order placed  PAP: done today  DEXA: DEXA scan at 65  COVID vaccine information is available at vaccine.[place].gov       She will schedule a mammogram.  She will followup in one year or sooner if needed.      An After Visit Summary was printed and given to the patient at discharge.  Return for GYN US for IUD check with OV.          Lurdes RUIZ 8/7/2024   Electronically signed

## 2024-08-13 LAB
GEN CATEG CVX/VAG CYTO-IMP: NORMAL
HPV I/H RISK 4 DNA CVX QL PROBE+SIG AMP: DETECTED
HPV16 DNA SPEC QL NAA+PROBE: NOT DETECTED
HPV18+45 E6+E7 MRNA CVX QL NAA+PROBE: NOT DETECTED
LAB AP CASE REPORT: NORMAL
LAB AP GYN ADDITIONAL INFORMATION: NORMAL
LAB AP GYN OTHER FINDINGS: NORMAL
Lab: NORMAL
PATH INTERP SPEC-IMP: NORMAL
STAT OF ADQ CVX/VAG CYTO-IMP: NORMAL

## 2024-08-14 ENCOUNTER — OFFICE VISIT (OUTPATIENT)
Dept: OBSTETRICS AND GYNECOLOGY | Age: 49
End: 2024-08-14
Payer: COMMERCIAL

## 2024-08-14 VITALS
WEIGHT: 243 LBS | HEIGHT: 67 IN | SYSTOLIC BLOOD PRESSURE: 124 MMHG | BODY MASS INDEX: 38.14 KG/M2 | DIASTOLIC BLOOD PRESSURE: 84 MMHG

## 2024-08-14 DIAGNOSIS — D25.9 UTERINE LEIOMYOMA, UNSPECIFIED LOCATION: Primary | ICD-10-CM

## 2024-08-14 DIAGNOSIS — T83.32XA INTRAUTERINE CONTRACEPTIVE DEVICE THREADS LOST, INITIAL ENCOUNTER: ICD-10-CM

## 2024-08-14 NOTE — PROGRESS NOTES
Chief Complaint   Patient presents with    Contraception     Patient is here for IUD check after strings not present on exam 8/7/24. TVUS in office today shows IUD in normal placement. Mirena IUD placed 8/1/2017 in [place].       History:  Yola Oakes is a 48 y.o. female who presents today for follow-up for evaluation of the above:    Contraception    Patient presents today for IUD check. Her IUD strings were not visible on exam on her annual.   Mirena IUD placed 08/01/2017          ROS:  Review of Systems   Constitutional: Negative.    HENT: Negative.     Eyes: Negative.    Respiratory: Negative.     Cardiovascular: Negative.    Gastrointestinal: Negative.    Endocrine: Negative.    Genitourinary: Negative.    Musculoskeletal: Negative.    Skin: Negative.    Neurological: Negative.    Psychiatric/Behavioral: Negative.         Ms. Oakes  reports that she has never smoked. She has never used smokeless tobacco. She reports current alcohol use. She reports that she does not use drugs.      Current Outpatient Medications:     ALPRAZolam (XANAX) 0.5 MG tablet, Take 1 tablet by mouth Daily As Needed., Disp: , Rfl:     budesonide (RINOCORT AQUA) 32 MCG/ACT nasal spray, 1 spray into the nostril(s) as directed by provider As Needed., Disp: , Rfl:     buPROPion XL (WELLBUTRIN XL) 150 MG 24 hr tablet, Take 1 tablet by mouth Every Morning., Disp: , Rfl:     DHA-EPA-Vitamin E (OMEGA-3 COMPLEX PO), Take  by mouth., Disp: , Rfl:     diclofenac (VOLTAREN) 75 MG EC tablet, diclofenac sodium 75 mg tablet,delayed release  TAKE 1 TABLET BY MOUTH EVERY 12 HOURS AS NEEDED, Disp: , Rfl:     EPINEPHrine (EPIPEN) 0.3 MG/0.3ML solution auto-injector injection, epinephrine 0.3 mg/0.3 mL injection, auto-injector, Disp: , Rfl:     famotidine (PEPCID) 40 MG tablet, Take 1 tablet by mouth Daily., Disp: , Rfl:     FLUoxetine (PROzac) 40 MG capsule, Take 2 capsules by mouth Daily., Disp: , Rfl:     gabapentin (NEURONTIN) 300 MG capsule, Take 1  "capsule by mouth 2 (Two) Times a Day., Disp: , Rfl:     hydrocortisone (Anucort-HC) 25 MG suppository, UNWRAP AND INSERT 1 SUPPOSITORY RECTALLY TWICE DAILY AS NEEDED, Disp: , Rfl:     loratadine-pseudoephedrine (CLARITIN-D 12-hour) 5-120 MG per 12 hr tablet, TAKE 1 TABLET BY MOUTH 1 TO 2 TIMES DAILY AS NEEDED, Disp: , Rfl:     NON FORMULARY, HRT Tablet - Eviaxs - Vit B6, B12, Folate, Disp: , Rfl:     Nurtec 75 MG dispersible tablet, , Disp: , Rfl:     omeprazole (priLOSEC) 40 MG capsule, Take 1 capsule by mouth Daily., Disp: , Rfl:     PROGESTERONE MICRONIZED TD, Place  on the skin as directed by provider. [facility] compound, Disp: , Rfl:     promethazine (PHENERGAN) 25 MG tablet, Take 1 tablet by mouth Daily As Needed., Disp: , Rfl:     rizatriptan (MAXALT) 10 MG tablet, Take 1 tablet by mouth Daily As Needed., Disp: , Rfl:     simvastatin (ZOCOR) 40 MG tablet, Take 1 tablet by mouth Every Night., Disp: , Rfl:     Testosterone 37.5 MG pellet, by Implant route., Disp: , Rfl:     tiZANidine (ZANAFLEX) 4 MG tablet, Take 1 tablet by mouth At Night As Needed for Muscle Spasms., Disp: , Rfl:     vitamin D (ERGOCALCIFEROL) 1.25 MG (90546 UT) capsule capsule, TAKE 1 CAPSULE BY MOUTH 2 TIMES EVERY WEEK AS DIRECTED, Disp: , Rfl:     fluticasone (Flonase) 50 MCG/ACT nasal spray, 1 spray into the nostril(s) as directed by provider Daily for 30 days. Administer 2 sprays in each nostril for each dose., Disp: 18.2 mL, Rfl: 6    Current Facility-Administered Medications:     lidocaine (XYLOCAINE) 1 % injection 10 mL, 10 mL, Subcutaneous, Once, Cici Francis MD    lidocaine 1% - EPINEPHrine 1:499051 (XYLOCAINE W/EPI) 1 %-1:089323 injection 10 mL, 10 mL, Injection, Once, Cici Francis MD      OBJECTIVE:  /84   Ht 170.2 cm (67\")   Wt 110 kg (243 lb)   BMI 38.06 kg/m²    Physical Exam  Constitutional:       Appearance: She is not ill-appearing.   Pulmonary:      Effort: No respiratory distress. "   Neurological:      Mental Status: She is oriented to person, place, and time.   Psychiatric:         Behavior: Behavior normal.         Assessment/Plan    Diagnoses and all orders for this visit:    1. Uterine leiomyoma, unspecified location (Primary)    2. Intrauterine contraceptive device threads lost, initial encounter    US is reviewed with the patient in the office today. IUD is normal in the uterus.        An After Visit Summary was printed and given to the patient at discharge.  Return in about 1 year (around 8/14/2025) for Annual physical with IUD removal needs 45 min. strings lost. Sooner if problems arise.          Lurdes Geronimo APRN. 8/14/2024   Electronically Signed

## 2024-08-16 LAB
NCCN CRITERIA FLAG: NORMAL
TYRER CUZICK SCORE: 15.7

## 2024-08-19 ENCOUNTER — HOSPITAL ENCOUNTER (OUTPATIENT)
Dept: MAMMOGRAPHY | Facility: HOSPITAL | Age: 49
Discharge: HOME | End: 2024-08-19
Admitting: NURSE PRACTITIONER
Payer: COMMERCIAL

## 2024-08-19 DIAGNOSIS — Z12.31 ENCOUNTER FOR SCREENING MAMMOGRAM FOR MALIGNANT NEOPLASM OF BREAST: ICD-10-CM

## 2024-08-19 PROCEDURE — 77063 BREAST TOMOSYNTHESIS BI: CPT

## 2024-08-19 PROCEDURE — 77067 SCR MAMMO BI INCL CAD: CPT

## 2025-08-15 ENCOUNTER — OFFICE VISIT (OUTPATIENT)
Dept: OBSTETRICS AND GYNECOLOGY | Age: 50
End: 2025-08-15
Payer: COMMERCIAL

## 2025-08-15 VITALS — HEIGHT: 67 IN | BODY MASS INDEX: 38.05 KG/M2

## 2025-08-15 DIAGNOSIS — Z01.419 WOMEN'S ANNUAL ROUTINE GYNECOLOGICAL EXAMINATION: Primary | ICD-10-CM

## 2025-08-15 DIAGNOSIS — Z12.4 SCREENING FOR CERVICAL CANCER: ICD-10-CM

## 2025-08-15 DIAGNOSIS — Z12.31 ENCOUNTER FOR SCREENING MAMMOGRAM FOR MALIGNANT NEOPLASM OF BREAST: ICD-10-CM

## 2025-08-15 DIAGNOSIS — Z30.432 ENCOUNTER FOR IUD REMOVAL: ICD-10-CM

## 2025-08-15 PROCEDURE — 87661 TRICHOMONAS VAGINALIS AMPLIF: CPT | Performed by: NURSE PRACTITIONER

## 2025-08-15 PROCEDURE — 87591 N.GONORRHOEAE DNA AMP PROB: CPT | Performed by: NURSE PRACTITIONER

## 2025-08-15 PROCEDURE — 87624 HPV HI-RISK TYP POOLED RSLT: CPT | Performed by: NURSE PRACTITIONER

## 2025-08-15 PROCEDURE — G0123 SCREEN CERV/VAG THIN LAYER: HCPCS | Performed by: NURSE PRACTITIONER

## 2025-08-15 PROCEDURE — 87491 CHLMYD TRACH DNA AMP PROBE: CPT | Performed by: NURSE PRACTITIONER

## 2025-08-15 RX ORDER — LEVOTHYROXINE, LIOTHYRONINE 19; 4.5 UG/1; UG/1
TABLET ORAL
COMMUNITY

## 2025-08-15 RX ORDER — SEMAGLUTIDE 2.68 MG/ML
INJECTION, SOLUTION SUBCUTANEOUS
COMMUNITY

## 2025-08-15 RX ORDER — FERROUS SULFATE 325(65) MG
1 TABLET ORAL DAILY
COMMUNITY
Start: 2025-06-10

## 2025-08-15 RX ORDER — ALBUTEROL SULFATE 90 UG/1
INHALANT RESPIRATORY (INHALATION)
COMMUNITY

## 2025-08-19 LAB
C TRACH RRNA SPEC QL NAA+PROBE: NOT DETECTED
GEN CATEG CVX/VAG CYTO-IMP: NORMAL
HPV I/H RISK 4 DNA CVX QL PROBE+SIG AMP: NOT DETECTED
LAB AP CASE REPORT: NORMAL
LAB AP GYN ADDITIONAL INFORMATION: NORMAL
Lab: NORMAL
N GONORRHOEA RRNA SPEC QL NAA+PROBE: NOT DETECTED
PATH INTERP SPEC-IMP: NORMAL
STAT OF ADQ CVX/VAG CYTO-IMP: NORMAL
TRICHOMONAS VAGINALIS PCR: NOT DETECTED

## 2025-08-21 ENCOUNTER — OFFICE VISIT (OUTPATIENT)
Dept: OBSTETRICS AND GYNECOLOGY | Age: 50
End: 2025-08-21
Payer: COMMERCIAL

## 2025-08-21 VITALS
DIASTOLIC BLOOD PRESSURE: 72 MMHG | WEIGHT: 243 LBS | SYSTOLIC BLOOD PRESSURE: 108 MMHG | HEIGHT: 67 IN | BODY MASS INDEX: 38.14 KG/M2

## 2025-08-21 DIAGNOSIS — R87.615 ENCOUNTER FOR REPEAT PAP SMEAR DUE TO PREVIOUS INSUFFICIENT CERVICAL CELLS: Primary | ICD-10-CM

## 2025-08-25 LAB
GEN CATEG CVX/VAG CYTO-IMP: NORMAL
HPV I/H RISK 4 DNA CVX QL PROBE+SIG AMP: NOT DETECTED
LAB AP CASE REPORT: NORMAL
LAB AP GYN ADDITIONAL INFORMATION: NORMAL
LAB AP GYN OTHER FINDINGS: NORMAL
Lab: NORMAL
PATH INTERP SPEC-IMP: NORMAL
STAT OF ADQ CVX/VAG CYTO-IMP: NORMAL

## (undated) DEVICE — SPONGE,NEURO,0.5"X3",XR,STRL,LF,10/PK: Brand: MEDLINE

## (undated) DEVICE — FRCP BIOP COLD ENDOJAW ALLGTR W/NDL 2.8X2300MM BLU

## (undated) DEVICE — CUFF,BP,DISP,1 TUBE,ADULT,HP: Brand: MEDLINE

## (undated) DEVICE — THE CHANNEL CLEANING BRUSH IS A NYLON FLEXI BRUSH ATTACHED TO A FLEXIBLE PLASTIC SHEATH DESIGNED TO SAFELY REMOVE DEBRIS FROM FLEXIBLE ENDOSCOPES.

## (undated) DEVICE — PK ENT HD AND NK 30

## (undated) DEVICE — WIPE MEROCEL 3.625X3IN

## (undated) DEVICE — Device: Brand: DEFENDO AIR/WATER/SUCTION AND BIOPSY VALVE

## (undated) DEVICE — TBG SMPL FLTR LINE NASL 02/C02 A/ BX/100

## (undated) DEVICE — SENSR O2 OXIMAX FNGR A/ 18IN NONSTR

## (undated) DEVICE — SUT GUT PLN 4/0 P3 18IN 1644G

## (undated) DEVICE — YANKAUER,BULB TIP WITH VENT: Brand: ARGYLE

## (undated) DEVICE — COAGULATOR SXN MEGADYNE FT/CONTRL 10F 6IN

## (undated) DEVICE — REFLEX ULTRA 45 WITH INTEGRATED CABLE: Brand: COBLATION

## (undated) DEVICE — CONMED SCOPE SAVER BITE BLOCK, 20X27 MM: Brand: SCOPE SAVER

## (undated) DEVICE — PK TURNOVER RM ADV

## (undated) DEVICE — GLV SURG BIOGEL M LTX PF 7 1/2

## (undated) DEVICE — MASK,OXYGEN,MED CONC,ADLT,7' TUB, UC: Brand: PENDING

## (undated) DEVICE — SINU FOAM: Brand: SINU-FOAM

## (undated) DEVICE — ENDOGATOR AUXILIARY WATER JET CONNECTOR: Brand: ENDOGATOR